# Patient Record
Sex: FEMALE | Race: WHITE | NOT HISPANIC OR LATINO | Employment: FULL TIME | URBAN - METROPOLITAN AREA
[De-identification: names, ages, dates, MRNs, and addresses within clinical notes are randomized per-mention and may not be internally consistent; named-entity substitution may affect disease eponyms.]

---

## 2017-11-17 ENCOUNTER — APPOINTMENT (OUTPATIENT)
Dept: WOMENS IMAGING | Facility: HOSPITAL | Age: 42
End: 2017-11-17

## 2017-11-17 PROCEDURE — 77067 SCR MAMMO BI INCL CAD: CPT | Performed by: RADIOLOGY

## 2017-11-17 PROCEDURE — 77063 BREAST TOMOSYNTHESIS BI: CPT | Performed by: RADIOLOGY

## 2018-07-19 ENCOUNTER — PREP FOR SURGERY (OUTPATIENT)
Dept: OTHER | Facility: HOSPITAL | Age: 43
End: 2018-07-19

## 2018-07-19 DIAGNOSIS — Z80.0 FAMILY HISTORY OF COLON CANCER: ICD-10-CM

## 2018-07-19 DIAGNOSIS — K63.5 COLON POLYPS: Primary | ICD-10-CM

## 2018-07-31 PROBLEM — Z80.0 FAMILY HISTORY OF COLON CANCER: Status: ACTIVE | Noted: 2018-07-31

## 2018-07-31 PROBLEM — K63.5 COLON POLYPS: Status: ACTIVE | Noted: 2018-07-31

## 2018-09-17 ENCOUNTER — ANESTHESIA (OUTPATIENT)
Dept: GASTROENTEROLOGY | Facility: HOSPITAL | Age: 43
End: 2018-09-17

## 2018-09-17 ENCOUNTER — HOSPITAL ENCOUNTER (OUTPATIENT)
Facility: HOSPITAL | Age: 43
Setting detail: HOSPITAL OUTPATIENT SURGERY
Discharge: HOME | End: 2018-09-17
Attending: INTERNAL MEDICINE | Admitting: INTERNAL MEDICINE

## 2018-09-17 ENCOUNTER — ANESTHESIA EVENT (OUTPATIENT)
Dept: GASTROENTEROLOGY | Facility: HOSPITAL | Age: 43
End: 2018-09-17

## 2018-09-17 VITALS
SYSTOLIC BLOOD PRESSURE: 127 MMHG | RESPIRATION RATE: 16 BRPM | HEART RATE: 60 BPM | BODY MASS INDEX: 26.4 KG/M2 | TEMPERATURE: 97.9 F | DIASTOLIC BLOOD PRESSURE: 84 MMHG | HEIGHT: 65 IN | OXYGEN SATURATION: 100 % | WEIGHT: 158.44 LBS

## 2018-09-17 DIAGNOSIS — K63.5 COLON POLYPS: ICD-10-CM

## 2018-09-17 DIAGNOSIS — Z80.0 FAMILY HISTORY OF COLON CANCER: ICD-10-CM

## 2018-09-17 LAB
B-HCG UR QL: NEGATIVE
INTERNAL NEGATIVE CONTROL: NEGATIVE
INTERNAL POSITIVE CONTROL: POSITIVE
Lab: NORMAL

## 2018-09-17 PROCEDURE — 45380 COLONOSCOPY AND BIOPSY: CPT | Performed by: INTERNAL MEDICINE

## 2018-09-17 PROCEDURE — 81025 URINE PREGNANCY TEST: CPT | Performed by: INTERNAL MEDICINE

## 2018-09-17 PROCEDURE — 25010000002 PROPOFOL 10 MG/ML EMULSION: Performed by: NURSE ANESTHETIST, CERTIFIED REGISTERED

## 2018-09-17 PROCEDURE — 88305 TISSUE EXAM BY PATHOLOGIST: CPT | Performed by: INTERNAL MEDICINE

## 2018-09-17 RX ORDER — PROPOFOL 10 MG/ML
VIAL (ML) INTRAVENOUS CONTINUOUS PRN
Status: DISCONTINUED | OUTPATIENT
Start: 2018-09-17 | End: 2018-09-17 | Stop reason: SURG

## 2018-09-17 RX ORDER — LIDOCAINE HYDROCHLORIDE 20 MG/ML
INJECTION, SOLUTION INFILTRATION; PERINEURAL AS NEEDED
Status: DISCONTINUED | OUTPATIENT
Start: 2018-09-17 | End: 2018-09-17 | Stop reason: SURG

## 2018-09-17 RX ORDER — PROPOFOL 10 MG/ML
VIAL (ML) INTRAVENOUS AS NEEDED
Status: DISCONTINUED | OUTPATIENT
Start: 2018-09-17 | End: 2018-09-17 | Stop reason: SURG

## 2018-09-17 RX ORDER — SODIUM CHLORIDE, SODIUM LACTATE, POTASSIUM CHLORIDE, CALCIUM CHLORIDE 600; 310; 30; 20 MG/100ML; MG/100ML; MG/100ML; MG/100ML
30 INJECTION, SOLUTION INTRAVENOUS CONTINUOUS PRN
Status: DISCONTINUED | OUTPATIENT
Start: 2018-09-17 | End: 2018-09-17 | Stop reason: HOSPADM

## 2018-09-17 RX ADMIN — SODIUM CHLORIDE, POTASSIUM CHLORIDE, SODIUM LACTATE AND CALCIUM CHLORIDE: 600; 310; 30; 20 INJECTION, SOLUTION INTRAVENOUS at 14:15

## 2018-09-17 RX ADMIN — LIDOCAINE HYDROCHLORIDE 100 MG: 20 INJECTION, SOLUTION INFILTRATION; PERINEURAL at 14:19

## 2018-09-17 RX ADMIN — SODIUM CHLORIDE, POTASSIUM CHLORIDE, SODIUM LACTATE AND CALCIUM CHLORIDE 30 ML/HR: 600; 310; 30; 20 INJECTION, SOLUTION INTRAVENOUS at 13:52

## 2018-09-17 RX ADMIN — PROPOFOL 50 MG: 10 INJECTION, EMULSION INTRAVENOUS at 14:19

## 2018-09-17 RX ADMIN — PROPOFOL 250 MCG/KG/MIN: 10 INJECTION, EMULSION INTRAVENOUS at 14:19

## 2018-09-17 NOTE — ANESTHESIA POSTPROCEDURE EVALUATION
Patient: Morena France    Procedure Summary     Date:  09/17/18 Room / Location:  [facility] ENDOSCOPY 8 / [facility] ENDOSCOPY    Anesthesia Start:  1415 Anesthesia Stop:  1444    Procedure:  COLONOSCOPY to CECUM/TI with COLD BX POLYPECTOMY (N/A ) Diagnosis:       Family history of colon cancer      Colon polyps      (Family history of colon cancer [Z80.0])      (Colon polyps [K63.5])    Surgeon:  Frandy Snow MD Provider:      Anesthesia Type:  MAC ASA Status:  2          Anesthesia Type: MAC  Last vitals  BP   127/84 (09/17/18 1502)   Temp   36.6 °C (97.9 °F) (09/17/18 1452)   Pulse   60 (09/17/18 1502)   Resp   16 (09/17/18 1502)     SpO2   100 % (09/17/18 1502)     Post Anesthesia Care and Evaluation    Patient location during evaluation: PACU  Patient participation: complete - patient participated  Level of consciousness: awake and alert  Pain management: adequate  Airway patency: patent  Anesthetic complications: No anesthetic complications    Cardiovascular status: acceptable  Respiratory status: acceptable  Hydration status: acceptable    Comments: --------------------            09/17/18               1502     --------------------   BP:       127/84     Pulse:      60       Resp:       16       Temp:                SpO2:      100%     --------------------

## 2018-09-17 NOTE — ANESTHESIA POSTPROCEDURE EVALUATION
Patient: Morena France    Procedure Summary     Date:  09/17/18 Room / Location:  [facility] ENDOSCOPY 8 / [facility] ENDOSCOPY    Anesthesia Start:  1415 Anesthesia Stop:  1444    Procedure:  COLONOSCOPY to CECUM/TI with COLD BX POLYPECTOMY (N/A ) Diagnosis:       Family history of colon cancer      Colon polyps      (Family history of colon cancer [Z80.0])      (Colon polyps [K63.5])    Surgeon:  Frandy Snow MD Provider:  Nicho Torres MD    Anesthesia Type:  MAC ASA Status:  2          Anesthesia Type: MAC  Last vitals  BP   127/84 (09/17/18 1502)   Temp   36.6 °C (97.9 °F) (09/17/18 1452)   Pulse   60 (09/17/18 1502)   Resp   16 (09/17/18 1502)     SpO2   100 % (09/17/18 1502)     Post Anesthesia Care and Evaluation    Patient location during evaluation: PACU  Patient participation: complete - patient participated  Level of consciousness: awake and alert  Pain management: adequate  Airway patency: patent  Anesthetic complications: No anesthetic complications    Cardiovascular status: acceptable  Respiratory status: acceptable  Hydration status: acceptable    Comments: --------------------            09/17/18               1502     --------------------   BP:       127/84     Pulse:      60       Resp:       16       Temp:                SpO2:      100%     --------------------

## 2018-09-17 NOTE — ANESTHESIA PREPROCEDURE EVALUATION
Anesthesia Evaluation     Patient summary reviewed and Nursing notes reviewed   NPO Solid Status: > 8 hours  NPO Liquid Status: > 4 hours           Airway   Mallampati: II  Neck ROM: full  No difficulty expected  Dental - normal exam     Pulmonary     breath sounds clear to auscultation  Cardiovascular     Rhythm: regular        Neuro/Psych  GI/Hepatic/Renal/Endo      Musculoskeletal     Abdominal    Substance History      OB/GYN          Other                        Anesthesia Plan    ASA 2     MAC     intravenous induction   Anesthetic plan, all risks, benefits, and alternatives have been provided, discussed and informed consent has been obtained with: patient.

## 2018-09-17 NOTE — H&P
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 Her dad had colon polyps and her paternal GM had colon cancer. She last had a colonoscopy in 7/13.    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 Her dad had colon polyps and her paternal GM had colon cancer. She last had a colonoscopy in 7/13.        Past Medical History:   Past Medical History:   Diagnosis Date   • Breast disorder     Titanium marker to left breast   • Endometriosis    • GERD (gastroesophageal reflux disease)    • History of colonoscopy with polypectomy        Family History:  Family History   Problem Relation Age of Onset   • Colon polyps Father    • Colon cancer Paternal Grandmother        Social History:   reports that she has quit smoking. She does not have any smokeless tobacco history on file. She reports that she drinks alcohol.    Medications:   No prescriptions prior to admission.       Allergies:  Patient has no known allergies.    ROS:    Pertinent items are noted in HPI     Objective     Blood pressure 127/82, pulse 64, temperature 98.4 °F (36.9 °C), temperature source Oral, resp. rate 16, height 165.1 cm (65\"), weight 71.9 kg (158 lb 7 oz), last menstrual period 09/10/2018, SpO2 96 %.    Physical Exam   Constitutional: Pt is oriented to person, place, and time and well-developed, well-nourished, and in no distress.   HENT:   Mouth/Throat: Oropharynx is clear and moist.   Neck: Normal range of motion. Neck supple.   Cardiovascular: Normal rate, regular rhythm and normal heart sounds.    Pulmonary/Chest: Effort normal and breath sounds normal. No respiratory distress. No  wheezes.   Abdominal: Soft. Bowel sounds are normal.   Skin: Skin is warm and dry.   Psychiatric: Mood, memory, affect and judgment normal.     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 Her dad had colon polyps and her " paternal GM had colon cancer. She last had a colonoscopy in 7/13.    Anticipated Surgical Procedure:  Colonoscopy    The risks, benefits, and alternatives of this procedure have been discussed with the patient or the responsible party- the patient understands and agrees to proceed.

## 2018-09-18 LAB
CYTO UR: NORMAL
LAB AP CASE REPORT: NORMAL
PATH REPORT.FINAL DX SPEC: NORMAL
PATH REPORT.GROSS SPEC: NORMAL

## 2018-09-27 ENCOUNTER — TELEPHONE (OUTPATIENT)
Dept: GASTROENTEROLOGY | Facility: CLINIC | Age: 43
End: 2018-09-27

## 2018-09-27 NOTE — TELEPHONE ENCOUNTER
----- Message from Frandy Snow MD sent at 9/23/2018  9:10 PM EDT -----  Tell her that the colon polyp that was removed was not cancerous and not precancerous.  I would recommend that she have a repeat colonoscopy in 5 years.. Thx. kjh

## 2018-09-27 NOTE — TELEPHONE ENCOUNTER
Pt called and advised per Dr Snow that the colon polyp that was removed was not cancerous and was not precancerous.  He recommends a repeat c/s in 5 yrs. Pt verb understanding.

## 2018-11-20 ENCOUNTER — APPOINTMENT (OUTPATIENT)
Dept: WOMENS IMAGING | Facility: HOSPITAL | Age: 43
End: 2018-11-20

## 2018-11-20 PROCEDURE — 77067 SCR MAMMO BI INCL CAD: CPT | Performed by: RADIOLOGY

## 2018-11-20 PROCEDURE — 77063 BREAST TOMOSYNTHESIS BI: CPT | Performed by: RADIOLOGY

## 2019-12-04 ENCOUNTER — APPOINTMENT (OUTPATIENT)
Dept: WOMENS IMAGING | Facility: HOSPITAL | Age: 44
End: 2019-12-04

## 2019-12-04 PROCEDURE — 77063 BREAST TOMOSYNTHESIS BI: CPT | Performed by: RADIOLOGY

## 2019-12-04 PROCEDURE — 77067 SCR MAMMO BI INCL CAD: CPT | Performed by: RADIOLOGY

## 2020-12-09 ENCOUNTER — APPOINTMENT (OUTPATIENT)
Dept: WOMENS IMAGING | Facility: HOSPITAL | Age: 45
End: 2020-12-09

## 2020-12-09 PROCEDURE — 77063 BREAST TOMOSYNTHESIS BI: CPT | Performed by: RADIOLOGY

## 2020-12-09 PROCEDURE — 77067 SCR MAMMO BI INCL CAD: CPT | Performed by: RADIOLOGY

## 2021-12-15 ENCOUNTER — APPOINTMENT (OUTPATIENT)
Dept: WOMENS IMAGING | Facility: HOSPITAL | Age: 46
End: 2021-12-15

## 2021-12-15 PROCEDURE — 77063 BREAST TOMOSYNTHESIS BI: CPT | Performed by: RADIOLOGY

## 2021-12-15 PROCEDURE — 77067 SCR MAMMO BI INCL CAD: CPT | Performed by: RADIOLOGY

## 2022-10-25 ENCOUNTER — OFFICE VISIT (OUTPATIENT)
Dept: GASTROENTEROLOGY | Facility: CLINIC | Age: 47
End: 2022-10-25

## 2022-10-25 ENCOUNTER — TELEPHONE (OUTPATIENT)
Dept: GASTROENTEROLOGY | Facility: CLINIC | Age: 47
End: 2022-10-25

## 2022-10-25 VITALS — DIASTOLIC BLOOD PRESSURE: 127 MMHG | SYSTOLIC BLOOD PRESSURE: 185 MMHG

## 2022-10-25 DIAGNOSIS — Z80.0 FAMILY HISTORY OF COLON CANCER: ICD-10-CM

## 2022-10-25 DIAGNOSIS — K63.5 HYPERPLASTIC COLONIC POLYP, UNSPECIFIED PART OF COLON: ICD-10-CM

## 2022-10-25 DIAGNOSIS — K58.2 IRRITABLE BOWEL SYNDROME WITH BOTH CONSTIPATION AND DIARRHEA: ICD-10-CM

## 2022-10-25 DIAGNOSIS — R10.13 EPIGASTRIC PAIN: Primary | ICD-10-CM

## 2022-10-25 DIAGNOSIS — K21.9 GASTROESOPHAGEAL REFLUX DISEASE, UNSPECIFIED WHETHER ESOPHAGITIS PRESENT: ICD-10-CM

## 2022-10-25 PROCEDURE — 99204 OFFICE O/P NEW MOD 45 MIN: CPT | Performed by: NURSE PRACTITIONER

## 2022-10-25 RX ORDER — FAMOTIDINE 20 MG/1
20 TABLET, FILM COATED ORAL DAILY
COMMUNITY
End: 2022-10-25

## 2022-10-25 RX ORDER — ATENOLOL 50 MG/1
TABLET ORAL
COMMUNITY
Start: 2022-09-09 | End: 2022-11-08

## 2022-10-25 RX ORDER — HYDROXYCHLOROQUINE SULFATE 200 MG/1
1 TABLET, FILM COATED ORAL EVERY 12 HOURS
COMMUNITY
Start: 2022-06-01

## 2022-10-25 RX ORDER — LISINOPRIL 20 MG/1
40 TABLET ORAL DAILY
COMMUNITY
Start: 2022-09-04 | End: 2023-03-03 | Stop reason: ALTCHOICE

## 2022-10-25 RX ORDER — MELATONIN
1000 DAILY
COMMUNITY

## 2022-10-25 RX ORDER — PANTOPRAZOLE SODIUM 40 MG/1
40 TABLET, DELAYED RELEASE ORAL DAILY
Qty: 30 TABLET | Refills: 0 | Status: SHIPPED | OUTPATIENT
Start: 2022-10-25 | End: 2022-11-13 | Stop reason: SDUPTHER

## 2022-10-25 NOTE — PROGRESS NOTES
Chief Complaint   Patient presents with   • Diarrhea   • Heartburn   • Constipation       Morena France is a 47 y.o. female who presents with GERD.    HPI  47-year-old female presents today as a new patient to our clinic with worsening GERD.  She has seen Dr. Snow in the past.  She is new to me today.  Last time she saw Dr. Snow was for a colonoscopy in September 2018.  At that time she was found to have some hyperplastic colon polyps.  Recall in 2023.  She has a history of diverticulitis.  She also has history of appendectomy.  She is a family history of colon cancer and colon polyps.  She has a history of GERD and admits she has been taking Pepcid and Tagamet when her symptoms flareup.  She admits she does not take anything routinely.  She tells me the reflux is really bad lately and she even has a bad taste in her mouth now.  She also has a history of IBS-mixed and admits she is been taking Culturelle IBS support probiotic.  She tells me she really does not think it is helping that much.  She is getting more more backed up lately.  She will have periods of constipation and then periods of diarrhea.  Having lots of gas and bloating.  She denies any dysphagia, nausea and vomiting, rectal bleeding or melena.  She admits her appetite is okay and her weight is stable.  Her blood pressure is high today and she tells me that she is currently working with her primary care provider about this.  Past Medical History:   Diagnosis Date   • Breast disorder     Titanium marker to left breast   • Diverticulitis of colon    • Endometriosis    • GERD (gastroesophageal reflux disease)    • History of colonoscopy with polypectomy    • Hypertension    • Irritable bowel syndrome with both constipation and diarrhea 10/25/2022       Past Surgical History:   Procedure Laterality Date   • APPENDECTOMY     • COLONOSCOPY N/A 09/17/2018    Procedure: COLONOSCOPY to CECUM/TI with COLD BX POLYPECTOMY;  Surgeon: Frandy Snow MD;   Location: [facility] ENDOSCOPY;  Service: Gastroenterology   • HYSTERECTOMY           Current Outpatient Medications:   •  atenolol (TENORMIN) 50 MG tablet, , Disp: , Rfl:   •  cholecalciferol (VITAMIN D3) 25 MCG (1000 UT) tablet, Take 1 tablet by mouth Daily., Disp: , Rfl:   •  hydroxychloroquine (PLAQUENIL) 200 MG tablet, Take 1 tablet by mouth Every 12 (Twelve) Hours., Disp: , Rfl:   •  lisinopril (PRINIVIL,ZESTRIL) 20 MG tablet, Take 2 tablets by mouth Daily., Disp: , Rfl:   •  pantoprazole (PROTONIX) 40 MG EC tablet, Take 1 tablet by mouth Daily., Disp: 30 tablet, Rfl: 0    Allergies   Allergen Reactions   • Chlorhexidine Rash       Social History     Socioeconomic History   • Marital status:    Tobacco Use   • Smoking status: Former   • Tobacco comments:     quit smoking 10years ago   Substance and Sexual Activity   • Alcohol use: Yes     Comment: rare       Family History   Problem Relation Age of Onset   • Colon polyps Father    • Colon cancer Paternal Grandmother        Review of Systems   Constitutional: Negative for appetite change, chills, diaphoresis, fatigue, fever and unexpected weight change.   HENT: Negative for nosebleeds, postnasal drip, sore throat, trouble swallowing and voice change.    Respiratory: Negative for cough, choking, chest tightness, shortness of breath, wheezing and stridor.    Cardiovascular: Negative for chest pain, palpitations and leg swelling.   Gastrointestinal: Positive for abdominal distention, abdominal pain, constipation, diarrhea and nausea. Negative for anal bleeding, blood in stool, rectal pain and vomiting.   Endocrine: Negative for polydipsia, polyphagia and polyuria.   Musculoskeletal: Negative for gait problem.   Skin: Negative for rash and wound.   Allergic/Immunologic: Negative for food allergies.   Neurological: Negative for dizziness, speech difficulty and light-headedness.   Psychiatric/Behavioral: Negative for confusion, self-injury, sleep disturbance and  suicidal ideas.       Vitals:    10/25/22 0835   BP: (!) 185/127       Physical Exam  Constitutional:       General: She is not in acute distress.     Appearance: She is well-developed. She is not ill-appearing.   HENT:      Head: Normocephalic.   Eyes:      Pupils: Pupils are equal, round, and reactive to light.   Cardiovascular:      Rate and Rhythm: Normal rate and regular rhythm.      Heart sounds: Normal heart sounds.   Pulmonary:      Effort: Pulmonary effort is normal.      Breath sounds: Normal breath sounds.   Abdominal:      General: Bowel sounds are normal. There is distension.      Palpations: Abdomen is soft. There is no mass.      Tenderness: There is no abdominal tenderness. There is no guarding or rebound.      Hernia: No hernia is present.   Musculoskeletal:         General: Normal range of motion.   Skin:     General: Skin is warm and dry.   Neurological:      Mental Status: She is alert and oriented to person, place, and time.   Psychiatric:         Speech: Speech normal.         Behavior: Behavior normal.         Judgment: Judgment normal.         No radiology results for the last 7 days       Assessment & Plan      1. Epigastric pain  - Case Request; Standing  - Case Request    2. Gastroesophageal reflux disease, unspecified whether esophagitis present  - Case Request; Standing  - Case Request    3. Irritable bowel syndrome with both constipation and diarrhea  - Case Request; Standing  - Case Request    4. Hyperplastic colonic polyp, unspecified part of colon  - Case Request; Standing  - Case Request    5. Family history of colon cancer  - Case Request; Standing  - Case Request      I had my nursing staff take her blood pressure a second time.  Still elevated at 186/101.  Patient denies any shortness of breath or chest pain.  She does feel like anxiety makes it worse.  She plans on following up with her PCP about this today.  I did tell her if he started having chest pain or shortness of breath  to take herself to the ER for immediate evaluation.  Given her history and current symptoms recommend EGD and colonoscopy with Dr. Snow for further evaluation.  Patient is agreeable to the scopes.  GERD is not well controlled currently.  We will put her on Protonix 40 mg daily to start.  Continue GERD precautions.  As for her constipation recommend daily fiber or daily MiraLAX to start.  Continue high-fiber diet.  Patient to call the office next week with an update.  Patient to follow-up with me in 2 weeks.  Patient is agreeable to the plan.

## 2022-10-25 NOTE — TELEPHONE ENCOUNTER
JASKARAN patient in office. Scheduled 01/24/2023  with arrival time of  715 am . Prep paper work given to patient. Patient advised arrival time may change based on [facility] guidelines. JASKARAN CORTEZ

## 2022-11-08 ENCOUNTER — OFFICE VISIT (OUTPATIENT)
Dept: GASTROENTEROLOGY | Facility: CLINIC | Age: 47
End: 2022-11-08

## 2022-11-08 VITALS
SYSTOLIC BLOOD PRESSURE: 206 MMHG | HEIGHT: 66 IN | WEIGHT: 166.2 LBS | TEMPERATURE: 96.2 F | OXYGEN SATURATION: 98 % | BODY MASS INDEX: 26.71 KG/M2 | HEART RATE: 59 BPM | DIASTOLIC BLOOD PRESSURE: 109 MMHG

## 2022-11-08 DIAGNOSIS — K21.9 GASTROESOPHAGEAL REFLUX DISEASE, UNSPECIFIED WHETHER ESOPHAGITIS PRESENT: Primary | ICD-10-CM

## 2022-11-08 DIAGNOSIS — R11.0 NAUSEA: ICD-10-CM

## 2022-11-08 DIAGNOSIS — K58.2 IRRITABLE BOWEL SYNDROME WITH BOTH CONSTIPATION AND DIARRHEA: ICD-10-CM

## 2022-11-08 DIAGNOSIS — K63.5 HYPERPLASTIC COLONIC POLYP, UNSPECIFIED PART OF COLON: ICD-10-CM

## 2022-11-08 PROCEDURE — 99214 OFFICE O/P EST MOD 30 MIN: CPT | Performed by: NURSE PRACTITIONER

## 2022-11-08 RX ORDER — METOPROLOL TARTRATE 50 MG/1
TABLET, FILM COATED ORAL
COMMUNITY
Start: 2022-10-25

## 2022-11-08 RX ORDER — ONDANSETRON 4 MG/1
4 TABLET, FILM COATED ORAL EVERY 8 HOURS PRN
Qty: 15 TABLET | Refills: 1 | Status: SHIPPED | OUTPATIENT
Start: 2022-11-08 | End: 2022-12-29 | Stop reason: SDUPTHER

## 2022-11-08 NOTE — PROGRESS NOTES
Chief Complaint   Patient presents with   • Abdominal Pain   • Nausea       Morena France is a  47 y.o. female here for a follow up visit for abdominal pain.    HPI  47-year-old female presents today for follow-up visit for abdominal pain and nausea.  She is a patient of Dr. Snow.  She was last seen in the office by me on 10/25/2022.  She has a history of GERD and admits she does really well during the day on Protonix 40 mg daily.  However she is having a lot of breakthrough reflux symptoms at night.  She been having more epigastric pain and more nausea.  She does use Zofran as needed.  She does have a history of constipation and admits she does well on MiraLAX as needed.  Her last colonoscopy was in September 2018.  She does have a history of hyperplastic colon polyps.  She is a family history of colon cancer.  She is scheduled for EGD and colonoscopy in January 2023 with Dr. Snow.  She denies any dysphagia, diarrhea, rectal bleeding or melena.  She admits appetite is good and her weight is stable.  She does admit her blood pressures been high lately because she has been working with her primary care about this.  They have been adjusting her medications.  She does feel like her anxiety makes things worse.  She definitely gets anxious coming to the doctor.  Past Medical History:   Diagnosis Date   • Breast disorder     Titanium marker to left breast   • Diverticulitis of colon    • Endometriosis    • GERD (gastroesophageal reflux disease)    • History of colonoscopy with polypectomy    • Hypertension    • Irritable bowel syndrome with both constipation and diarrhea 10/25/2022       Past Surgical History:   Procedure Laterality Date   • APPENDECTOMY     • COLONOSCOPY N/A 09/17/2018    Procedure: COLONOSCOPY to CECUM/TI with COLD BX POLYPECTOMY;  Surgeon: Frandy Snow MD;  Location: [facility] ENDOSCOPY;  Service: Gastroenterology   • HYSTERECTOMY         Scheduled Meds:    Continuous Infusions:No current  facility-administered medications for this visit.      PRN Meds:.    Allergies   Allergen Reactions   • Chlorhexidine Rash       Social History     Socioeconomic History   • Marital status:    Tobacco Use   • Smoking status: Former   • Tobacco comments:     quit smoking 10years ago   Substance and Sexual Activity   • Alcohol use: Yes     Comment: rare       Family History   Problem Relation Age of Onset   • Colon polyps Father    • Colon cancer Paternal Grandmother        Review of Systems   Constitutional: Negative for appetite change, chills, diaphoresis, fatigue, fever and unexpected weight change.   HENT: Negative for nosebleeds, postnasal drip, sore throat, trouble swallowing and voice change.    Respiratory: Negative for cough, choking, chest tightness, shortness of breath, wheezing and stridor.    Cardiovascular: Negative for chest pain, palpitations and leg swelling.   Gastrointestinal: Positive for abdominal distention, abdominal pain and nausea. Negative for anal bleeding, blood in stool, constipation, diarrhea, rectal pain and vomiting.   Endocrine: Negative for polydipsia, polyphagia and polyuria.   Musculoskeletal: Negative for gait problem.   Skin: Negative for rash and wound.   Allergic/Immunologic: Negative for food allergies.   Neurological: Negative for dizziness, speech difficulty and light-headedness.   Psychiatric/Behavioral: Negative for confusion, self-injury, sleep disturbance and suicidal ideas.       Vitals:    11/08/22 0847   BP: (!) 206/109   Pulse: 59   Temp: 96.2 °F (35.7 °C)   SpO2: 98%       Physical Exam  Constitutional:       General: She is not in acute distress.     Appearance: She is well-developed. She is not ill-appearing.   HENT:      Head: Normocephalic.   Eyes:      Pupils: Pupils are equal, round, and reactive to light.   Cardiovascular:      Rate and Rhythm: Normal rate and regular rhythm.      Heart sounds: Normal heart sounds.   Pulmonary:      Effort: Pulmonary  effort is normal.      Breath sounds: Normal breath sounds.   Abdominal:      General: Bowel sounds are normal. There is distension.      Palpations: Abdomen is soft. There is no mass.      Tenderness: There is no abdominal tenderness. There is no guarding or rebound.      Hernia: No hernia is present.   Musculoskeletal:         General: Normal range of motion.   Skin:     General: Skin is warm and dry.   Neurological:      Mental Status: She is alert and oriented to person, place, and time.   Psychiatric:         Speech: Speech normal.         Behavior: Behavior normal.         Judgment: Judgment normal.         No radiology results for the last 7 days     Diagnoses and all orders for this visit:    1. Gastroesophageal reflux disease, unspecified whether esophagitis present (Primary)  Overview:  Added automatically from request for surgery [id number]      2. Irritable bowel syndrome with both constipation and diarrhea  Overview:  Added automatically from request for surgery [id number]      3. Nausea    4. Hyperplastic colonic polyp, unspecified part of colon  Overview:  Added automatically from request for surgery [id number]      Other orders  -     ondansetron (Zofran) 4 MG tablet; Take 1 tablet by mouth Every 8 (Eight) Hours As Needed for Nausea or Vomiting.  Dispense: 15 tablet; Refill: 1    Her blood pressure is high again this visit.  She is working with her PCP and titrating her blood pressure medication.  She does feel like that her anxiety really makes this much worse.  She denies any shortness of breath or chest pain at this time.  GERD definitely seems better during the day on pantoprazole 40 mg every morning.  However still having some breakthrough reflux and nausea at night and first thing in the morning upon waking up.  Will increase Protonix to 40 mg twice daily and see if we can get rid of those symptoms.  I explained to the patient that the plan would be for her to take the PPI twice daily for the next 8 to  12 weeks and then hopefully be able to taper back off to just once a day.  Continue Zofran as needed for nausea.  Bowels seem to be moving well every other day on MiraLAX as needed.  Patient to call the office next week with an update.  Patient to keep scheduled EGD and colonoscopy in January with Dr. Snow.  Patient to follow-up with me in February 2023.  Patient is agreeable to the plan.

## 2022-11-14 RX ORDER — PANTOPRAZOLE SODIUM 40 MG/1
40 TABLET, DELAYED RELEASE ORAL DAILY
Qty: 30 TABLET | Refills: 0 | Status: SHIPPED | OUTPATIENT
Start: 2022-11-14 | End: 2022-12-05

## 2022-12-05 RX ORDER — PANTOPRAZOLE SODIUM 40 MG/1
TABLET, DELAYED RELEASE ORAL
Qty: 30 TABLET | Refills: 0 | Status: SHIPPED | OUTPATIENT
Start: 2022-12-05 | End: 2022-12-29 | Stop reason: SDUPTHER

## 2022-12-29 RX ORDER — PANTOPRAZOLE SODIUM 40 MG/1
40 TABLET, DELAYED RELEASE ORAL
Qty: 60 TABLET | Refills: 0 | Status: SHIPPED | OUTPATIENT
Start: 2022-12-29 | End: 2023-02-06 | Stop reason: SDUPTHER

## 2022-12-29 RX ORDER — ONDANSETRON 4 MG/1
4 TABLET, FILM COATED ORAL EVERY 8 HOURS PRN
Qty: 15 TABLET | Refills: 1 | Status: SHIPPED | OUTPATIENT
Start: 2022-12-29

## 2022-12-29 NOTE — TELEPHONE ENCOUNTER
Ghislaine request received for pantoprazole 40 mg daily and zofran 4 mg prn.     See o/v notes of 11/8/22 - \"  Will increase Protonix to 40 mg twice daily and see if we can get rid of those symptoms.  I explained to the patient that the plan would be for her to take the PPI twice daily for the next 8 to 12 weeks and then hopefully be able to taper back off to just once a day. \"    It is noted pt scheduled for egd and c/s 1/24.     Call to pt.  States has been taking pantoprazole 2x/day with complete relief of symptoms.  Message to SOCO Yang to check if may remain on 2x/day until upcoming scopes.

## 2023-01-23 ENCOUNTER — TELEPHONE (OUTPATIENT)
Dept: GASTROENTEROLOGY | Facility: CLINIC | Age: 48
End: 2023-01-23

## 2023-01-23 NOTE — TELEPHONE ENCOUNTER
Caller: Morena France    Relationship to patient: Self    Best call back number: [phone] CONTACT ANYTIME     Patient is needing:PT WANTED TO TAKE THE PILL PREP INSTEAD OF THE LIQUID PREP.PT WANTED TO KNOW IF THIS WAS OK.  PT IS HAVING THEIR PROCEDURE TOMORROW. PLEASE GIVE A CALL BACK.

## 2023-01-24 ENCOUNTER — HOSPITAL ENCOUNTER (OUTPATIENT)
Facility: HOSPITAL | Age: 48
Setting detail: HOSPITAL OUTPATIENT SURGERY
Discharge: HOME | End: 2023-01-24
Attending: INTERNAL MEDICINE | Admitting: INTERNAL MEDICINE
Payer: COMMERCIAL

## 2023-01-24 ENCOUNTER — ANESTHESIA (OUTPATIENT)
Dept: GASTROENTEROLOGY | Facility: HOSPITAL | Age: 48
End: 2023-01-24
Payer: COMMERCIAL

## 2023-01-24 ENCOUNTER — ANESTHESIA EVENT (OUTPATIENT)
Dept: GASTROENTEROLOGY | Facility: HOSPITAL | Age: 48
End: 2023-01-24
Payer: COMMERCIAL

## 2023-01-24 VITALS
WEIGHT: 166 LBS | RESPIRATION RATE: 16 BRPM | HEART RATE: 50 BPM | DIASTOLIC BLOOD PRESSURE: 98 MMHG | SYSTOLIC BLOOD PRESSURE: 172 MMHG | OXYGEN SATURATION: 99 % | HEIGHT: 66 IN | BODY MASS INDEX: 26.68 KG/M2

## 2023-01-24 DIAGNOSIS — K58.2 IRRITABLE BOWEL SYNDROME WITH BOTH CONSTIPATION AND DIARRHEA: ICD-10-CM

## 2023-01-24 DIAGNOSIS — K21.9 GASTROESOPHAGEAL REFLUX DISEASE, UNSPECIFIED WHETHER ESOPHAGITIS PRESENT: ICD-10-CM

## 2023-01-24 DIAGNOSIS — Z80.0 FAMILY HISTORY OF COLON CANCER: ICD-10-CM

## 2023-01-24 DIAGNOSIS — R10.13 EPIGASTRIC PAIN: ICD-10-CM

## 2023-01-24 DIAGNOSIS — K63.5 HYPERPLASTIC COLONIC POLYP, UNSPECIFIED PART OF COLON: ICD-10-CM

## 2023-01-24 LAB
ALBUMIN SERPL-MCNC: 4.2 G/DL (ref 3.5–5.2)
ALBUMIN/GLOB SERPL: 2.5 G/DL
ALP SERPL-CCNC: 59 U/L (ref 39–117)
ALT SERPL W P-5'-P-CCNC: 8 U/L (ref 1–33)
ANION GAP SERPL CALCULATED.3IONS-SCNC: 8 MMOL/L (ref 5–15)
AST SERPL-CCNC: 15 U/L (ref 1–32)
BASOPHILS # BLD AUTO: 0.02 10*3/MM3 (ref 0–0.2)
BASOPHILS NFR BLD AUTO: 0.5 % (ref 0–1.5)
BILIRUB SERPL-MCNC: 0.5 MG/DL (ref 0–1.2)
BUN SERPL-MCNC: 9 MG/DL (ref 6–20)
BUN/CREAT SERPL: 9.8 (ref 7–25)
CALCIUM SPEC-SCNC: 9.3 MG/DL (ref 8.6–10.5)
CHLORIDE SERPL-SCNC: 105 MMOL/L (ref 98–107)
CO2 SERPL-SCNC: 27 MMOL/L (ref 22–29)
CREAT SERPL-MCNC: 0.92 MG/DL (ref 0.57–1)
DEPRECATED RDW RBC AUTO: 41.9 FL (ref 37–54)
EGFRCR SERPLBLD CKD-EPI 2021: 76.5 ML/MIN/1.73
EOSINOPHIL # BLD AUTO: 0.04 10*3/MM3 (ref 0–0.4)
EOSINOPHIL NFR BLD AUTO: 1.1 % (ref 0.3–6.2)
ERYTHROCYTE [DISTWIDTH] IN BLOOD BY AUTOMATED COUNT: 13.6 % (ref 12.3–15.4)
GLOBULIN UR ELPH-MCNC: 1.7 GM/DL
GLUCOSE SERPL-MCNC: 93 MG/DL (ref 65–99)
HCT VFR BLD AUTO: 36.3 % (ref 34–46.6)
HGB BLD-MCNC: 12.3 G/DL (ref 12–15.9)
IMM GRANULOCYTES # BLD AUTO: 0.01 10*3/MM3 (ref 0–0.05)
IMM GRANULOCYTES NFR BLD AUTO: 0.3 % (ref 0–0.5)
LYMPHOCYTES # BLD AUTO: 1.33 10*3/MM3 (ref 0.7–3.1)
LYMPHOCYTES NFR BLD AUTO: 35.7 % (ref 19.6–45.3)
MAGNESIUM SERPL-MCNC: 1.8 MG/DL (ref 1.6–2.6)
MCH RBC QN AUTO: 29 PG (ref 26.6–33)
MCHC RBC AUTO-ENTMCNC: 33.9 G/DL (ref 31.5–35.7)
MCV RBC AUTO: 85.6 FL (ref 79–97)
MONOCYTES # BLD AUTO: 0.27 10*3/MM3 (ref 0.1–0.9)
MONOCYTES NFR BLD AUTO: 7.2 % (ref 5–12)
NEUTROPHILS NFR BLD AUTO: 2.06 10*3/MM3 (ref 1.7–7)
NEUTROPHILS NFR BLD AUTO: 55.2 % (ref 42.7–76)
NRBC BLD AUTO-RTO: 0 /100 WBC (ref 0–0.2)
PLATELET # BLD AUTO: 163 10*3/MM3 (ref 140–450)
PMV BLD AUTO: 11.1 FL (ref 6–12)
POTASSIUM SERPL-SCNC: 3.8 MMOL/L (ref 3.5–5.2)
PROT SERPL-MCNC: 5.9 G/DL (ref 6–8.5)
RBC # BLD AUTO: 4.24 10*6/MM3 (ref 3.77–5.28)
SODIUM SERPL-SCNC: 140 MMOL/L (ref 136–145)
WBC NRBC COR # BLD: 3.73 10*3/MM3 (ref 3.4–10.8)

## 2023-01-24 PROCEDURE — 88305 TISSUE EXAM BY PATHOLOGIST: CPT | Performed by: INTERNAL MEDICINE

## 2023-01-24 PROCEDURE — 43239 EGD BIOPSY SINGLE/MULTIPLE: CPT | Performed by: INTERNAL MEDICINE

## 2023-01-24 PROCEDURE — 85025 COMPLETE CBC W/AUTO DIFF WBC: CPT | Performed by: INTERNAL MEDICINE

## 2023-01-24 PROCEDURE — 25010000002 PROPOFOL 10 MG/ML EMULSION: Performed by: ANESTHESIOLOGY

## 2023-01-24 PROCEDURE — 45380 COLONOSCOPY AND BIOPSY: CPT | Performed by: INTERNAL MEDICINE

## 2023-01-24 PROCEDURE — 83735 ASSAY OF MAGNESIUM: CPT | Performed by: INTERNAL MEDICINE

## 2023-01-24 PROCEDURE — 80053 COMPREHEN METABOLIC PANEL: CPT | Performed by: INTERNAL MEDICINE

## 2023-01-24 RX ORDER — EPHEDRINE SULFATE 50 MG/ML
INJECTION, SOLUTION INTRAVENOUS AS NEEDED
Status: DISCONTINUED | OUTPATIENT
Start: 2023-01-24 | End: 2023-01-24 | Stop reason: SURG

## 2023-01-24 RX ORDER — LIDOCAINE HYDROCHLORIDE 20 MG/ML
INJECTION, SOLUTION INFILTRATION; PERINEURAL AS NEEDED
Status: DISCONTINUED | OUTPATIENT
Start: 2023-01-24 | End: 2023-01-24 | Stop reason: SURG

## 2023-01-24 RX ORDER — SODIUM CHLORIDE, SODIUM LACTATE, POTASSIUM CHLORIDE, CALCIUM CHLORIDE 600; 310; 30; 20 MG/100ML; MG/100ML; MG/100ML; MG/100ML
1000 INJECTION, SOLUTION INTRAVENOUS CONTINUOUS
Status: DISCONTINUED | OUTPATIENT
Start: 2023-01-24 | End: 2023-01-24 | Stop reason: HOSPADM

## 2023-01-24 RX ORDER — PROPOFOL 10 MG/ML
VIAL (ML) INTRAVENOUS AS NEEDED
Status: DISCONTINUED | OUTPATIENT
Start: 2023-01-24 | End: 2023-01-24 | Stop reason: SURG

## 2023-01-24 RX ADMIN — SODIUM CHLORIDE, POTASSIUM CHLORIDE, SODIUM LACTATE AND CALCIUM CHLORIDE 1000 ML: 600; 310; 30; 20 INJECTION, SOLUTION INTRAVENOUS at 07:36

## 2023-01-24 RX ADMIN — LIDOCAINE HYDROCHLORIDE 60 MG: 20 INJECTION, SOLUTION INFILTRATION; PERINEURAL at 08:27

## 2023-01-24 RX ADMIN — EPHEDRINE SULFATE 10 MG: 50 INJECTION INTRAVENOUS at 08:42

## 2023-01-24 RX ADMIN — PROPOFOL 150 MG: 10 INJECTION, EMULSION INTRAVENOUS at 08:27

## 2023-01-24 RX ADMIN — PROPOFOL 200 MCG/KG/MIN: 10 INJECTION, EMULSION INTRAVENOUS at 08:27

## 2023-01-24 NOTE — ANESTHESIA POSTPROCEDURE EVALUATION
Patient: Morena France    Procedure Summary     Date: 01/24/23 Room / Location: [facility] ENDOSCOPY 5 / [facility] ENDOSCOPY    Anesthesia Start: 0820 Anesthesia Stop: 0857    Procedures:       ESOPHAGOGASTRODUODENOSCOPY WITH BIOPSIES (Esophagus)      COLONOSCOPY TO CECUM AND TERMINAL ILEUM WITH BIOPSIES Diagnosis:       Epigastric pain      Gastroesophageal reflux disease, unspecified whether esophagitis present      Irritable bowel syndrome with both constipation and diarrhea      Hyperplastic colonic polyp, unspecified part of colon      Family history of colon cancer      (Epigastric pain [R10.13])      (Gastroesophageal reflux disease, unspecified whether esophagitis present [K21.9])      (Irritable bowel syndrome with both constipation and diarrhea [K58.2])      (Hyperplastic colonic polyp, unspecified part of colon [K63.5])      (Family history of colon cancer [Z80.0])    Surgeons: Frandy Snow MD Provider: Armand Zheng MD    Anesthesia Type: MAC ASA Status: 2          Anesthesia Type: MAC    Vitals  No vitals data found for the desired time range.          Post Anesthesia Care and Evaluation    Patient location during evaluation: PHASE II  Patient participation: complete - patient participated  Level of consciousness: awake and alert  Pain management: adequate    Airway patency: patent  Anesthetic complications: No anesthetic complications  PONV Status: none  Cardiovascular status: acceptable and hemodynamically stable  Respiratory status: acceptable, nonlabored ventilation and spontaneous ventilation  Hydration status: acceptable

## 2023-01-24 NOTE — ANESTHESIA PREPROCEDURE EVALUATION
Anesthesia Evaluation     Patient summary reviewed and Nursing notes reviewed   NPO Solid Status: > 8 hours  NPO Liquid Status: > 6 hours           Airway   Mallampati: II  TM distance: >3 FB  Neck ROM: full  No difficulty expected  Dental - normal exam     Pulmonary - normal exam   (+) a smoker Former,   Cardiovascular     Rate: abnormal    (+) hypertension 2 medications or greater,     PE comment: SB/[id number]    Neuro/Psych  GI/Hepatic/Renal/Endo    (+)  GERD,      Musculoskeletal     Abdominal  - normal exam   Substance History      OB/GYN          Other                      Anesthesia Plan    ASA 2     MAC     intravenous induction     Anesthetic plan, risks, benefits, and alternatives have been provided, discussed and informed consent has been obtained with: patient.        CODE STATUS:

## 2023-01-24 NOTE — DISCHARGE INSTRUCTIONS
For the next 24 hours patient needs to be with a responsible adult.    For 24 hours DO NOT drive, operate machinery, appliances, drink alcohol, make important decisions or sign legal documents.    Start with a light or bland diet if you are feeling sick to your stomach otherwise advance to regular diet as tolerated.    Follow recommendations on procedure report if provided by your doctor.    Call Dr Snow for problems [phone]. Await pathology result in 7-10 days.    Problems may include but not limited to: large amounts of bleeding, trouble breathing, repeated vomiting, severe unrelieved pain, fever or chills.

## 2023-01-24 NOTE — H&P
Chief Complaint   Patient presents with   • Diarrhea   • Heartburn   • Constipation         Morena France is a 47 y.o. female who presents with GERD.     HPI  47-year-old female presents today as a new patient to our clinic with worsening GERD.  She has seen Dr. Snow in the past.  She is new to me today.  Last time she saw Dr. Snow was for a colonoscopy in September 2018.  At that time she was found to have some hyperplastic colon polyps.  Recall in 2023.  She has a history of diverticulitis.  She also has history of appendectomy.  She is a family history of colon cancer and colon polyps.  She has a history of GERD and admits she has been taking Pepcid and Tagamet when her symptoms flareup.  She admits she does not take anything routinely.  She tells me the reflux is really bad lately and she even has a bad taste in her mouth now.  She also has a history of IBS-mixed and admits she is been taking Culturelle IBS support probiotic.  She tells me she really does not think it is helping that much.  She is getting more more backed up lately.  She will have periods of constipation and then periods of diarrhea.  Having lots of gas and bloating.  She denies any dysphagia, nausea and vomiting, rectal bleeding or melena.  She admits her appetite is okay and her weight is stable.  Her blood pressure is high today and she tells me that she is currently working with her primary care provider about this.  Medical History        Past Medical History:   Diagnosis Date   • Breast disorder       Titanium marker to left breast   • Diverticulitis of colon     • Endometriosis     • GERD (gastroesophageal reflux disease)     • History of colonoscopy with polypectomy     • Hypertension     • Irritable bowel syndrome with both constipation and diarrhea 10/25/2022            Surgical History         Past Surgical History:   Procedure Laterality Date   • APPENDECTOMY       • COLONOSCOPY N/A 09/17/2018     Procedure: COLONOSCOPY to  CECUM/TI with COLD BX POLYPECTOMY;  Surgeon: Frandy Snow MD;  Location: [facility] ENDOSCOPY;  Service: Gastroenterology   • HYSTERECTOMY                   Current Outpatient Medications:   •  atenolol (TENORMIN) 50 MG tablet, , Disp: , Rfl:   •  cholecalciferol (VITAMIN D3) 25 MCG (1000 UT) tablet, Take 1 tablet by mouth Daily., Disp: , Rfl:   •  hydroxychloroquine (PLAQUENIL) 200 MG tablet, Take 1 tablet by mouth Every 12 (Twelve) Hours., Disp: , Rfl:   •  lisinopril (PRINIVIL,ZESTRIL) 20 MG tablet, Take 2 tablets by mouth Daily., Disp: , Rfl:   •  pantoprazole (PROTONIX) 40 MG EC tablet, Take 1 tablet by mouth Daily., Disp: 30 tablet, Rfl: 0          Allergies   Allergen Reactions   • Chlorhexidine Rash         Social History   Social History      Socioeconomic History   • Marital status:    Tobacco Use   • Smoking status: Former   • Tobacco comments:       quit smoking 10years ago   Substance and Sexual Activity   • Alcohol use: Yes       Comment: rare                  Family History   Problem Relation Age of Onset   • Colon polyps Father     • Colon cancer Paternal Grandmother           Review of Systems   Constitutional: Negative for appetite change, chills, diaphoresis, fatigue, fever and unexpected weight change.   HENT: Negative for nosebleeds, postnasal drip, sore throat, trouble swallowing and voice change.    Respiratory: Negative for cough, choking, chest tightness, shortness of breath, wheezing and stridor.    Cardiovascular: Negative for chest pain, palpitations and leg swelling.   Gastrointestinal: Positive for abdominal distention, abdominal pain, constipation, diarrhea and nausea. Negative for anal bleeding, blood in stool, rectal pain and vomiting.   Endocrine: Negative for polydipsia, polyphagia and polyuria.   Musculoskeletal: Negative for gait problem.   Skin: Negative for rash and wound.   Allergic/Immunologic: Negative for food allergies.   Neurological: Negative for dizziness, speech  difficulty and light-headedness.   Psychiatric/Behavioral: Negative for confusion, self-injury, sleep disturbance and suicidal ideas.             Vitals:     10/25/22 0835   BP: (!) 185/127         Physical Exam  Constitutional:       General: She is not in acute distress.     Appearance: She is well-developed. She is not ill-appearing.   HENT:      Head: Normocephalic.   Eyes:      Pupils: Pupils are equal, round, and reactive to light.   Cardiovascular:      Rate and Rhythm: Normal rate and regular rhythm.      Heart sounds: Normal heart sounds.   Pulmonary:      Effort: Pulmonary effort is normal.      Breath sounds: Normal breath sounds.   Abdominal:      General: Bowel sounds are normal. There is distension.      Palpations: Abdomen is soft. There is no mass.      Tenderness: There is no abdominal tenderness. There is no guarding or rebound.      Hernia: No hernia is present.   Musculoskeletal:         General: Normal range of motion.   Skin:     General: Skin is warm and dry.   Neurological:      Mental Status: She is alert and oriented to person, place, and time.   Psychiatric:         Speech: Speech normal.         Behavior: Behavior normal.         Judgment: Judgment normal.            No radiology results for the last 7 days     Assessment & Plan        1. Epigastric pain  - Case Request; Standing  - Case Request     2. Gastroesophageal reflux disease, unspecified whether esophagitis present  - Case Request; Standing  - Case Request     3. Irritable bowel syndrome with both constipation and diarrhea  - Case Request; Standing  - Case Request     4. Hyperplastic colonic polyp, unspecified part of colon  - Case Request; Standing  - Case Request     5. Family history of colon cancer  - Case Request; Standing  - Case Request        I had my nursing staff take her blood pressure a second time.  Still elevated at 186/101.  Patient denies any shortness of breath or chest pain.  She does feel like anxiety makes it  worse.  She plans on following up with her PCP about this today.  I did tell her if he started having chest pain or shortness of breath to take herself to the ER for immediate evaluation.  Given her history and current symptoms recommend EGD and colonoscopy with Dr. Snow for further evaluation.  Patient is agreeable to the scopes.  GERD is not well controlled currently.  We will put her on Protonix 40 mg daily to start.  Continue GERD precautions.  As for her constipation recommend daily fiber or daily MiraLAX to start.  Continue high-fiber diet.  Patient to call the office next week with an update.  Patient to follow-up with me in 2 weeks.  Patient is agreeable to the plan.              1/24/23 - No change from the above H and P.    Frandy Snow M.D.

## 2023-01-24 NOTE — PROGRESS NOTES
01/24/23       Tell her that her lab work from earlier today came back normal.  Please send copy of this report to her PCP.  Karena flower

## 2023-01-25 LAB
LAB AP CASE REPORT: NORMAL
PATH REPORT.FINAL DX SPEC: NORMAL
PATH REPORT.GROSS SPEC: NORMAL

## 2023-01-26 ENCOUNTER — TELEPHONE (OUTPATIENT)
Dept: GASTROENTEROLOGY | Facility: CLINIC | Age: 48
End: 2023-01-26
Payer: COMMERCIAL

## 2023-01-26 NOTE — TELEPHONE ENCOUNTER
01/24/23       Tell her that her lab work from earlier today came back normal.  Please send copy of this report to her PCP.  Karena flower       Patient Communication     Released  Seen

## 2023-02-06 NOTE — TELEPHONE ENCOUNTER
Caller: Morena France    Relationship: Self    Best call back number: [phone]    Requested Prescriptions:   Requested Prescriptions     Pending Prescriptions Disp Refills   • pantoprazole (PROTONIX) 40 MG EC tablet 60 tablet 0     Sig: Take 1 tablet by mouth 2 (Two) Times a Day Before Meals.        Pharmacy where request should be sent:  [facility]  -  [phone]    Additional details provided by patient: PT COMPLETELY OUT OF MEDICATION.    Does the patient have less than a 3 day supply:  [x] Yes  [] No    If the office needs to give you a call back, can they leave a voicemail: [x] Yes [] No    Iliana Barajas Rep   02/06/23 14:54 EST

## 2023-02-07 RX ORDER — PANTOPRAZOLE SODIUM 40 MG/1
40 TABLET, DELAYED RELEASE ORAL
Qty: 60 TABLET | Refills: 0 | Status: SHIPPED | OUTPATIENT
Start: 2023-02-07 | End: 2023-02-22 | Stop reason: SDUPTHER

## 2023-02-08 NOTE — PROGRESS NOTES
02/08/23       Tell her that the lab work done on 1/24/2023 looked okay.       The pathology from the EGD showed normal duodenal biopsies.  The stomach biopsies showed mild chronic inflammation with no evidence of Helicobacter pylori.  The colon biopsies showed minimal nonspecific inflammation on the cecal biopsies but the rectal biopsies were completely normal.  The minimal nonspecific chronic inflammation could be from resolving infection, from medications that she may take?,  Or from developing microscopic colitis.  I would recommend that she have a repeat colonoscopy in 5 years.  I would also recommend that she take a fiber supplement daily to see if the alternating diarrhea and constipation resolved.  I would have her follow-up with me in the office in 2 to 3 months.       Please send a copy of this report to her PCP.  Karena flower

## 2023-02-22 RX ORDER — PANTOPRAZOLE SODIUM 40 MG/1
40 TABLET, DELAYED RELEASE ORAL
Qty: 180 TABLET | Refills: 3 | Status: SHIPPED | OUTPATIENT
Start: 2023-02-22

## 2023-03-03 ENCOUNTER — OFFICE VISIT (OUTPATIENT)
Dept: GASTROENTEROLOGY | Facility: CLINIC | Age: 48
End: 2023-03-03
Payer: COMMERCIAL

## 2023-03-03 VITALS
HEART RATE: 57 BPM | HEIGHT: 66 IN | DIASTOLIC BLOOD PRESSURE: 99 MMHG | TEMPERATURE: 97.3 F | SYSTOLIC BLOOD PRESSURE: 175 MMHG | WEIGHT: 172 LBS | OXYGEN SATURATION: 98 % | BODY MASS INDEX: 27.64 KG/M2

## 2023-03-03 DIAGNOSIS — R19.7 DIARRHEA, UNSPECIFIED TYPE: ICD-10-CM

## 2023-03-03 DIAGNOSIS — K63.5 HYPERPLASTIC COLONIC POLYP, UNSPECIFIED PART OF COLON: ICD-10-CM

## 2023-03-03 DIAGNOSIS — K21.9 GASTROESOPHAGEAL REFLUX DISEASE WITHOUT ESOPHAGITIS: Primary | ICD-10-CM

## 2023-03-03 DIAGNOSIS — Z80.0 FAMILY HISTORY OF COLON CANCER: ICD-10-CM

## 2023-03-03 PROCEDURE — 99213 OFFICE O/P EST LOW 20 MIN: CPT | Performed by: NURSE PRACTITIONER

## 2023-03-03 RX ORDER — NAPROXEN 500 MG/1
TABLET ORAL
COMMUNITY
Start: 2023-01-13

## 2023-03-03 RX ORDER — ATORVASTATIN CALCIUM 10 MG/1
1 TABLET, FILM COATED ORAL EVERY 24 HOURS
COMMUNITY

## 2023-03-03 RX ORDER — ATENOLOL 50 MG/1
50 TABLET ORAL EVERY MORNING
COMMUNITY
Start: 2023-02-18

## 2023-03-03 RX ORDER — LISINOPRIL 40 MG/1
TABLET ORAL
COMMUNITY
Start: 2023-01-13

## 2023-03-03 RX ORDER — SERTRALINE HYDROCHLORIDE 25 MG/1
TABLET, FILM COATED ORAL
COMMUNITY
Start: 2023-02-07

## 2023-03-03 NOTE — PROGRESS NOTES
Chief Complaint   Patient presents with   • Heartburn   • EGD & Colonoscopy f/up       Morena France is a  48 y.o. female here for a follow up visit for GERD.    HPI  48-year-old female presents today for follow-up visit for GERD.  She is a patient of Dr. Snow.  She was last seen in the office by me on 11/8/2020.  She underwent EGD and colonoscopy on 1/24/2023.  EGD showed mild chronic gastritis.  Path was negative.  Colonoscopy showed nonspecific inflammation of the colon biopsies.  Rectal biopsies were normal.  Colonoscopy otherwise normal.  Path was positive for questionable microscopic colitis.  Patient is happy to report that her diarrhea has completely stopped.  Her bowels are moving really well on MiraLAX as needed.  She does try to eat a high-fiber diet and eats fiber bars.  Have a history of GERD/gastritis and admits she does well on Protonix 40 mg twice a day.  She tells me she ran out of it for about 3 weeks and really had some breakthrough reflux symptoms.  She is back on it now and taking it twice a day and it has really helped.  She is no longer having any nausea and is not needing the Zofran.  She denies any dysphagia, abdominal pain, nausea and vomiting, diarrhea, constipation, rectal bleeding or melena.  She was appetite is good and her weight is stable.  He does have a GI family history of colon cancer with a paternal grandmother and her father had colon polyps.  She has been treated in the past for diverticulitis.  Past Medical History:   Diagnosis Date   • Breast disorder     Titanium marker to left breast   • Diverticulitis of colon    • Endometriosis    • GERD (gastroesophageal reflux disease)    • History of colonoscopy with polypectomy    • Hypertension    • Irritable bowel syndrome with both constipation and diarrhea 10/25/2022       Past Surgical History:   Procedure Laterality Date   • APPENDECTOMY     • BILATERAL BREAST REDUCTION     • COLONOSCOPY N/A 09/17/2018    Procedure:  COLONOSCOPY to CECUM/TI with COLD BX POLYPECTOMY;  Surgeon: Frandy Snow MD;  Location: [facility] ENDOSCOPY;  Service: Gastroenterology   • COLONOSCOPY N/A 1/24/2023    Procedure: COLONOSCOPY TO CECUM AND TERMINAL ILEUM WITH BIOPSIES;  Surgeon: Frandy Snow MD;  Location: [facility] ENDOSCOPY;  Service: Gastroenterology;  Laterality: N/A;  PRE- FAMILY HISTORY OF COLON CANCER, DIARRHEA  POST- INTERNAL HEMORRHOIDS, DIVERTICULOSIS   • ENDOSCOPY N/A 1/24/2023    Procedure: ESOPHAGOGASTRODUODENOSCOPY WITH BIOPSIES;  Surgeon: Frandy Snow MD;  Location: [facility] ENDOSCOPY;  Service: Gastroenterology;  Laterality: N/A;  PRE- DYSPEPSIA  POST- GASTRITS   • HYSTERECTOMY         Scheduled Meds:    Continuous Infusions:No current facility-administered medications for this visit.      PRN Meds:.    Allergies   Allergen Reactions   • Chlorhexidine Rash       Social History     Socioeconomic History   • Marital status:    Tobacco Use   • Smoking status: Former   • Tobacco comments:     quit smoking 10years ago   Vaping Use   • Vaping Use: Never used   Substance and Sexual Activity   • Alcohol use: Yes     Comment: rare       Family History   Problem Relation Age of Onset   • Colon polyps Father    • Colon cancer Paternal Grandmother        Review of Systems   Constitutional: Negative for appetite change, chills, diaphoresis, fatigue, fever and unexpected weight change.   HENT: Negative for nosebleeds, postnasal drip, sore throat, trouble swallowing and voice change.    Respiratory: Negative for cough, choking, chest tightness, shortness of breath, wheezing and stridor.    Cardiovascular: Negative for chest pain, palpitations and leg swelling.   Gastrointestinal: Negative for abdominal distention, abdominal pain, anal bleeding, blood in stool, constipation, diarrhea, nausea, rectal pain and vomiting.   Endocrine: Negative for polydipsia, polyphagia and polyuria.   Musculoskeletal: Negative for gait problem.   Skin: Negative for  rash and wound.   Allergic/Immunologic: Negative for food allergies.   Neurological: Negative for dizziness, speech difficulty and light-headedness.   Psychiatric/Behavioral: Negative for confusion, self-injury, sleep disturbance and suicidal ideas.       Vitals:    03/03/23 0859   BP: 175/99   Pulse: 57   Temp: 97.3 °F (36.3 °C)   SpO2: 98%       Physical Exam  Constitutional:       General: She is not in acute distress.     Appearance: She is well-developed. She is not ill-appearing.   HENT:      Head: Normocephalic.   Eyes:      Pupils: Pupils are equal, round, and reactive to light.   Cardiovascular:      Rate and Rhythm: Normal rate and regular rhythm.      Heart sounds: Normal heart sounds.   Pulmonary:      Effort: Pulmonary effort is normal.      Breath sounds: Normal breath sounds.   Abdominal:      General: Bowel sounds are normal. There is no distension.      Palpations: Abdomen is soft. There is no mass.      Tenderness: There is no abdominal tenderness. There is no guarding or rebound.      Hernia: No hernia is present.   Musculoskeletal:         General: Normal range of motion.   Skin:     General: Skin is warm and dry.   Neurological:      Mental Status: She is alert and oriented to person, place, and time.   Psychiatric:         Speech: Speech normal.         Behavior: Behavior normal.         Judgment: Judgment normal.         No radiology results for the last 7 days     Diagnoses and all orders for this visit:    1. Gastroesophageal reflux disease without esophagitis (Primary)  Overview:  Added automatically from request for surgery [id number]      2. Diarrhea, unspecified type    3. Hyperplastic colonic polyp, unspecified part of colon  Overview:  Added automatically from request for surgery [id number]      4. Family history of colon cancer  Overview:  Added automatically from request for surgery [id number]       Reviewed EGD and colonoscopy results with her today.  GERD seems well controlled on Protonix  40 mg twice daily.  Continue this for the next 3 months.  Then consider tapering.  Continue GERD precautions.  Bowels moving well on high-fiber diet.  No longer having any diarrhea.  We did discuss possible microscopic colitis on her path report at this time we will not treat it since she is not symptomatic anymore.  Overall she is doing well.  Patient to call the office with any issues.  Patient to follow-up in 3 months.  Patient is agreeable to the plan.

## 2023-06-07 ENCOUNTER — OFFICE VISIT (OUTPATIENT)
Dept: GASTROENTEROLOGY | Facility: CLINIC | Age: 48
End: 2023-06-07
Payer: COMMERCIAL

## 2023-06-07 VITALS
HEIGHT: 66 IN | SYSTOLIC BLOOD PRESSURE: 179 MMHG | BODY MASS INDEX: 28.33 KG/M2 | DIASTOLIC BLOOD PRESSURE: 106 MMHG | OXYGEN SATURATION: 97 % | TEMPERATURE: 97.3 F | WEIGHT: 176.3 LBS | HEART RATE: 49 BPM

## 2023-06-07 DIAGNOSIS — Z83.71 FH: COLON POLYPS: ICD-10-CM

## 2023-06-07 DIAGNOSIS — Z80.0 FAMILY HISTORY OF COLON CANCER: ICD-10-CM

## 2023-06-07 DIAGNOSIS — K21.9 GASTROESOPHAGEAL REFLUX DISEASE WITHOUT ESOPHAGITIS: Primary | ICD-10-CM

## 2023-06-07 PROCEDURE — 99213 OFFICE O/P EST LOW 20 MIN: CPT | Performed by: INTERNAL MEDICINE

## 2023-06-07 RX ORDER — HYDROCHLOROTHIAZIDE 25 MG/1
TABLET ORAL
COMMUNITY
Start: 2023-03-31

## 2023-06-07 NOTE — PROGRESS NOTES
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 EGD showed mild chronic gastritis. Path was negative. Colonoscopy showed nonspecific inflammation of the colon biopsies. Rectal biopsies were normal. Colonoscopy otherwise normal. Path was positive for questionable microscopic colitis.  I recommended a repeat colonoscopy in 5 years.       She ffeels much better on Protonix 40 mg/day. It helps the nausea and heartburn. No chest or abdominal pain. No dyspahgia. NO diarrhea or constiaption. NO rectal bleeding or melena. Weight stable.     Past Medical History:   Diagnosis Date    Breast disorder     Titanium marker to left breast    Diverticulitis of colon     Endometriosis     GERD (gastroesophageal reflux disease)     History of colonoscopy with polypectomy     Hypertension     Irritable bowel syndrome with both constipation and diarrhea 10/25/2022       Past Surgical History:   Procedure Laterality Date    APPENDECTOMY      BILATERAL BREAST REDUCTION      COLONOSCOPY N/A 09/17/2018    Procedure: COLONOSCOPY to CECUM/TI with COLD BX POLYPECTOMY;  Surgeon: Frandy Snow MD;  Location: [facility] ENDOSCOPY;  Service: Gastroenterology    COLONOSCOPY N/A 01/24/2023    Procedure: COLONOSCOPY TO CECUM AND TERMINAL ILEUM WITH BIOPSIES;  Surgeon: Frandy Snow MD;  Location: [facility] ENDOSCOPY;  Service: Gastroenterology;  Laterality: N/A;  PRE- FAMILY HISTORY OF COLON CANCER, DIARRHEA  POST- INTERNAL HEMORRHOIDS, DIVERTICULOSIS    ENDOSCOPY N/A 01/24/2023    Procedure: ESOPHAGOGASTRODUODENOSCOPY WITH BIOPSIES;  Surgeon: Frandy Snow MD;  Location: [facility] ENDOSCOPY;  Service: Gastroenterology;  Laterality: N/A;  PRE- DYSPEPSIA  POST- GASTRITS    HYSTERECTOMY      UPPER GASTROINTESTINAL ENDOSCOPY           Current Outpatient Medications:     atorvastatin (LIPITOR) 10 MG tablet, Take 1 tablet by mouth  Daily., Disp: , Rfl:     cholecalciferol (VITAMIN D3) 25 MCG (1000 UT) tablet, Take 1 tablet by mouth Daily., Disp: , Rfl:     hydroCHLOROthiazide (HYDRODIURIL) 25 MG tablet, , Disp: , Rfl:     hydroxychloroquine (PLAQUENIL) 200 MG tablet, Take 1 tablet by mouth Every 12 (Twelve) Hours., Disp: , Rfl:     lisinopril (PRINIVIL,ZESTRIL) 40 MG tablet, , Disp: , Rfl:     metoprolol tartrate (LOPRESSOR) 50 MG tablet, 2 tablets 2 (Two) Times a Day., Disp: , Rfl:     naproxen (NAPROSYN) 500 MG tablet, , Disp: , Rfl:     ondansetron (Zofran) 4 MG tablet, Take 1 tablet by mouth Every 8 (Eight) Hours As Needed for Nausea or Vomiting., Disp: 15 tablet, Rfl: 1    pantoprazole (PROTONIX) 40 MG EC tablet, Take 1 tablet by mouth 2 (Two) Times a Day Before Meals., Disp: 180 tablet, Rfl: 3    sertraline (ZOLOFT) 25 MG tablet, , Disp: , Rfl:     Allergies   Allergen Reactions    Chlorhexidine Rash       Family History   Problem Relation Age of Onset    Colon polyps Father     Colon cancer Paternal Grandmother        Social History     Socioeconomic History    Marital status:    Tobacco Use    Smoking status: Former    Tobacco comments:     quit smoking 10years ago   Vaping Use    Vaping Use: Never used   Substance and Sexual Activity    Alcohol use: Yes     Alcohol/week: 2.0 standard drinks     Types: 2 Glasses of wine per week     Comment: rare    Drug use: Never       Review of Systems   Gastrointestinal:  Negative for abdominal pain.   All other systems reviewed and are negative.  Pertinent positives and negatives documented in the HPI and all other systems reviewed and were found to be negative.  Vitals:    06/07/23 0954   BP: (!) 179/106   Pulse: (!) 49   Temp: 97.3 °F (36.3 °C)   SpO2: 97%       Physical Exam  Vitals reviewed.   Constitutional:       General: She is not in acute distress.     Appearance: Normal appearance. She is well-developed. She is not diaphoretic.   HENT:      Head: Normocephalic and atraumatic. Hair  is normal.      Right Ear: Hearing, tympanic membrane, ear canal and external ear normal. No decreased hearing noted. No drainage.      Left Ear: Hearing, tympanic membrane, ear canal and external ear normal. No decreased hearing noted.      Nose: Nose normal. No nasal deformity.      Mouth/Throat:      Mouth: Mucous membranes are moist.   Eyes:      General: Lids are normal.         Right eye: No discharge.         Left eye: No discharge.      Extraocular Movements: Extraocular movements intact.      Conjunctiva/sclera: Conjunctivae normal.      Pupils: Pupils are equal, round, and reactive to light.   Neck:      Thyroid: No thyromegaly.      Vascular: No JVD.      Trachea: No tracheal deviation.   Cardiovascular:      Rate and Rhythm: Normal rate and regular rhythm.      Pulses: Normal pulses.      Heart sounds: Normal heart sounds. No murmur heard.    No friction rub. No gallop.   Pulmonary:      Effort: Pulmonary effort is normal. No respiratory distress.      Breath sounds: Normal breath sounds. No wheezing or rales.   Chest:      Chest wall: No tenderness.   Abdominal:      General: Bowel sounds are normal. There is no distension.      Palpations: Abdomen is soft. There is no mass.      Tenderness: There is no abdominal tenderness. There is no guarding or rebound.      Hernia: No hernia is present.   Musculoskeletal:         General: No tenderness or deformity. Normal range of motion.      Cervical back: Normal range of motion and neck supple.   Lymphadenopathy:      Cervical: No cervical adenopathy.   Skin:     General: Skin is warm and dry.      Findings: No erythema or rash.   Neurological:      Mental Status: She is alert and oriented to person, place, and time.      Cranial Nerves: No cranial nerve deficit.      Motor: No abnormal muscle tone.      Coordination: Coordination normal.      Deep Tendon Reflexes: Reflexes are normal and symmetric. Reflexes normal.   Psychiatric:         Mood and Affect: Mood  normal.         Behavior: Behavior normal.         Thought Content: Thought content normal.         Judgment: Judgment normal.       Diagnoses and all orders for this visit:    1. Gastroesophageal reflux disease without esophagitis (Primary)    2. Family history of colon cancer    3. FH: colon polyps      Assessment:  1. family history of colon cancer (pgm) and colon polyps (dad).   2.  GERD  3. RA (sees Dr. Billingsley)  4.     Recommendations:  1. Decrease the protonix 40 mg to once daily.  2. F/u 6 mos.   3.     Return in about 6 months (around 12/7/2023).    Frandy Snow MD  6/7/2023

## 2023-08-23 RX ORDER — ONDANSETRON 4 MG/1
TABLET, FILM COATED ORAL
Qty: 15 TABLET | Refills: 0 | Status: SHIPPED | OUTPATIENT
Start: 2023-08-23

## 2023-12-07 ENCOUNTER — OFFICE VISIT (OUTPATIENT)
Dept: GASTROENTEROLOGY | Facility: CLINIC | Age: 48
End: 2023-12-07
Payer: COMMERCIAL

## 2023-12-07 VITALS
HEART RATE: 57 BPM | WEIGHT: 178 LBS | SYSTOLIC BLOOD PRESSURE: 125 MMHG | DIASTOLIC BLOOD PRESSURE: 85 MMHG | TEMPERATURE: 98 F | HEIGHT: 66 IN | BODY MASS INDEX: 28.61 KG/M2 | OXYGEN SATURATION: 97 %

## 2023-12-07 DIAGNOSIS — Z80.0 FAMILY HISTORY OF COLON CANCER: Primary | ICD-10-CM

## 2023-12-07 DIAGNOSIS — K21.00 GASTROESOPHAGEAL REFLUX DISEASE WITH ESOPHAGITIS WITHOUT HEMORRHAGE: ICD-10-CM

## 2023-12-07 DIAGNOSIS — D12.6 ADENOMATOUS POLYP OF COLON, UNSPECIFIED PART OF COLON: ICD-10-CM

## 2023-12-07 NOTE — PROGRESS NOTES
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 EGD showed mild chronic gastritis. Path was negative. Colonoscopy showed nonspecific inflammation of the colon biopsies. Rectal biopsies were normal. Colonoscopy otherwise normal. Path was positive for questionable microscopic colitis.  I recommended a repeat colonoscopy in 5 years.        I last saw her in 6 of 2023:  Assessment:  1. family history of colon cancer (pgm) and colon polyps (dad).   2.  GERD  3. RA (sees Dr. Billingsley)  4.      Recommendations:  1. Decrease the protonix 40 mg to once daily.  2. F/u 6 mos.        Protonix 40 mg/AM works well. No heartburn but has nausea occasionallly x many years. NO abdominal or chest pain. No dysphagia. NO diarrhea or constipation. NO rectal bleeding or melena. Her weight is up.     Past Medical History:   Diagnosis Date    Breast disorder     Titanium marker to left breast    Diverticulitis of colon     Endometriosis     GERD (gastroesophageal reflux disease)     History of colonoscopy with polypectomy     Hypertension     Irritable bowel syndrome with both constipation and diarrhea 10/25/2022       Past Surgical History:   Procedure Laterality Date    APPENDECTOMY      BILATERAL BREAST REDUCTION      COLONOSCOPY N/A 09/17/2018    Procedure: COLONOSCOPY to CECUM/TI with COLD BX POLYPECTOMY;  Surgeon: Frandy Snow MD;  Location: [facility] ENDOSCOPY;  Service: Gastroenterology    COLONOSCOPY N/A 01/24/2023    Procedure: COLONOSCOPY TO CECUM AND TERMINAL ILEUM WITH BIOPSIES;  Surgeon: Frandy Snow MD;  Location: [facility] ENDOSCOPY;  Service: Gastroenterology;  Laterality: N/A;  PRE- FAMILY HISTORY OF COLON CANCER, DIARRHEA  POST- INTERNAL HEMORRHOIDS, DIVERTICULOSIS    ENDOSCOPY N/A 01/24/2023    Procedure: ESOPHAGOGASTRODUODENOSCOPY WITH BIOPSIES;  Surgeon:  Frandy Snow MD;  Location: [facility] ENDOSCOPY;  Service: Gastroenterology;  Laterality: N/A;  PRE- DYSPEPSIA  POST- GASTRITS    HYSTERECTOMY      UPPER GASTROINTESTINAL ENDOSCOPY           Current Outpatient Medications:     atorvastatin (LIPITOR) 10 MG tablet, Take 1 tablet by mouth Daily., Disp: , Rfl:     cholecalciferol (VITAMIN D3) 25 MCG (1000 UT) tablet, Take 1 tablet by mouth Daily., Disp: , Rfl:     hydroCHLOROthiazide (HYDRODIURIL) 25 MG tablet, , Disp: , Rfl:     hydroxychloroquine (PLAQUENIL) 200 MG tablet, Take 0.5 tablets by mouth Every 12 (Twelve) Hours., Disp: , Rfl:     lisinopril (PRINIVIL,ZESTRIL) 40 MG tablet, , Disp: , Rfl:     metoprolol tartrate (LOPRESSOR) 50 MG tablet, 2 tablets 2 (Two) Times a Day., Disp: , Rfl:     naproxen (NAPROSYN) 500 MG tablet, As Needed., Disp: , Rfl:     ondansetron (ZOFRAN) 4 MG tablet, TAKE 1 TABLET BY MOUTH EVERY EIGHT HOURS AS NEEDED FOR NAUSEA OR VOMITING, Disp: 15 tablet, Rfl: 0    pantoprazole (PROTONIX) 40 MG EC tablet, Take 1 tablet by mouth 2 (Two) Times a Day Before Meals., Disp: 180 tablet, Rfl: 3    sertraline (ZOLOFT) 25 MG tablet, , Disp: , Rfl:     Allergies   Allergen Reactions    Chlorhexidine Rash       Family History   Problem Relation Age of Onset    Colon polyps Father     Colon cancer Paternal Grandmother        Social History     Socioeconomic History    Marital status:    Tobacco Use    Smoking status: Former    Tobacco comments:     quit smoking 10years ago   Vaping Use    Vaping Use: Never used   Substance and Sexual Activity    Alcohol use: Yes     Alcohol/week: 2.0 standard drinks of alcohol     Types: 2 Glasses of wine per week     Comment: rare    Drug use: Never       Review of Systems   Gastrointestinal:  Positive for nausea. Negative for abdominal pain.   All other systems reviewed and are negative.    Pertinent positives and negatives documented in the HPI and all other systems reviewed and were found to be  negative.  Vitals:    12/07/23 1115   BP: 125/85   Pulse: 57   Temp: 98 °F (36.7 °C)   SpO2: 97%       Physical Exam  Vitals reviewed.   Constitutional:       General: She is not in acute distress.     Appearance: Normal appearance. She is well-developed. She is obese. She is not diaphoretic.   HENT:      Head: Normocephalic and atraumatic. Hair is normal.      Right Ear: Hearing, tympanic membrane, ear canal and external ear normal. No decreased hearing noted. No drainage.      Left Ear: Hearing, tympanic membrane, ear canal and external ear normal. No decreased hearing noted.      Nose: Nose normal. No nasal deformity.      Mouth/Throat:      Mouth: Mucous membranes are moist.   Eyes:      General: Lids are normal.         Right eye: No discharge.         Left eye: No discharge.      Extraocular Movements: Extraocular movements intact.      Conjunctiva/sclera: Conjunctivae normal.      Pupils: Pupils are equal, round, and reactive to light.   Neck:      Thyroid: No thyromegaly.      Vascular: No JVD.      Trachea: No tracheal deviation.   Cardiovascular:      Rate and Rhythm: Normal rate and regular rhythm.      Pulses: Normal pulses.      Heart sounds: Normal heart sounds. No murmur heard.     No friction rub. No gallop.   Pulmonary:      Effort: Pulmonary effort is normal. No respiratory distress.      Breath sounds: Normal breath sounds. No wheezing or rales.   Chest:      Chest wall: No tenderness.   Abdominal:      General: Bowel sounds are normal. There is no distension.      Palpations: Abdomen is soft. There is no mass.      Tenderness: There is no abdominal tenderness. There is no guarding or rebound.      Hernia: No hernia is present.   Musculoskeletal:         General: No tenderness or deformity. Normal range of motion.      Cervical back: Normal range of motion and neck supple.   Lymphadenopathy:      Cervical: No cervical adenopathy.   Skin:     General: Skin is warm and dry.      Findings: No  erythema or rash.   Neurological:      Mental Status: She is alert and oriented to person, place, and time.      Cranial Nerves: No cranial nerve deficit.      Motor: No abnormal muscle tone.      Coordination: Coordination normal.      Deep Tendon Reflexes: Reflexes are normal and symmetric. Reflexes normal.   Psychiatric:         Mood and Affect: Mood normal.         Behavior: Behavior normal.         Thought Content: Thought content normal.         Judgment: Judgment normal.         Diagnoses and all orders for this visit:    1. Family history of colon cancer (Primary)    2. Adenomatous polyp of colon, unspecified part of colon    3. Gastroesophageal reflux disease with esophagitis without hemorrhage      Assessment:  family history of colon cancer (pgm) and colon polyps (dad).    GERD   RA (sees Dr. Billingsley)  Nausea intermittently      Recommendations:  Labs: she will have this done by her PCP.   We discussed doing another CT abd/pelvis but she prefers not.  F/u 1 yr.    Return in about 1 year (around 12/7/2024).    Frandy Snow MD  12/7/2023

## 2024-09-05 ENCOUNTER — PATIENT ROUNDING (BHMG ONLY) (OUTPATIENT)
Dept: CARDIOLOGY | Facility: CLINIC | Age: 49
End: 2024-09-05
Payer: COMMERCIAL

## 2024-09-05 ENCOUNTER — OFFICE VISIT (OUTPATIENT)
Dept: CARDIOLOGY | Facility: CLINIC | Age: 49
End: 2024-09-05
Payer: COMMERCIAL

## 2024-09-05 VITALS
DIASTOLIC BLOOD PRESSURE: 93 MMHG | HEIGHT: 66 IN | SYSTOLIC BLOOD PRESSURE: 140 MMHG | HEART RATE: 56 BPM | WEIGHT: 145 LBS | BODY MASS INDEX: 23.3 KG/M2

## 2024-09-05 DIAGNOSIS — R42 DIZZINESS: ICD-10-CM

## 2024-09-05 DIAGNOSIS — I10 PRIMARY HYPERTENSION: ICD-10-CM

## 2024-09-05 DIAGNOSIS — R94.31 ABNORMAL EKG: Primary | ICD-10-CM

## 2024-09-05 DIAGNOSIS — R55 SYNCOPE AND COLLAPSE: ICD-10-CM

## 2024-09-05 PROCEDURE — 93000 ELECTROCARDIOGRAM COMPLETE: CPT | Performed by: INTERNAL MEDICINE

## 2024-09-05 PROCEDURE — 99204 OFFICE O/P NEW MOD 45 MIN: CPT | Performed by: INTERNAL MEDICINE

## 2024-09-05 RX ORDER — BISOPROLOL FUMARATE AND HYDROCHLOROTHIAZIDE 5; 6.25 MG/1; MG/1
1 TABLET ORAL DAILY
Qty: 30 TABLET | Refills: 6 | Status: SHIPPED | OUTPATIENT
Start: 2024-09-05

## 2024-09-05 RX ORDER — OLMESARTAN MEDOXOMIL 20 MG/1
20 TABLET ORAL DAILY
Qty: 30 TABLET | Refills: 3 | Status: SHIPPED | OUTPATIENT
Start: 2024-09-05

## 2024-09-05 NOTE — PROGRESS NOTES
September 5, 2024    Tell me about your visit with us. What things went well?         We're always looking for ways to make our patients' experiences even better. Do you have recommendations on ways we may improve?      Overall were you satisfied with your first visit to our practice?        I appreciate you taking the time to speak with me today. Is there anything else I can do for you?       Thank you, and have a great day.

## 2024-09-25 ENCOUNTER — HOSPITAL ENCOUNTER (OUTPATIENT)
Dept: CARDIOLOGY | Facility: HOSPITAL | Age: 49
Discharge: HOME | End: 2024-09-25
Payer: COMMERCIAL

## 2024-09-25 VITALS
BODY MASS INDEX: 23.3 KG/M2 | SYSTOLIC BLOOD PRESSURE: 118 MMHG | HEART RATE: 59 BPM | WEIGHT: 145 LBS | DIASTOLIC BLOOD PRESSURE: 70 MMHG | OXYGEN SATURATION: 99 % | HEIGHT: 66 IN

## 2024-09-25 VITALS
OXYGEN SATURATION: 99 % | BODY MASS INDEX: 23.31 KG/M2 | HEIGHT: 66 IN | WEIGHT: 145.06 LBS | HEART RATE: 59 BPM | SYSTOLIC BLOOD PRESSURE: 118 MMHG

## 2024-09-25 PROBLEM — R55 SYNCOPE AND COLLAPSE: Status: ACTIVE | Noted: 2024-09-25

## 2024-09-25 PROBLEM — R42 DIZZINESS: Status: ACTIVE | Noted: 2024-09-25

## 2024-09-25 PROBLEM — R94.31 ABNORMAL EKG: Status: ACTIVE | Noted: 2024-09-25

## 2024-09-25 PROBLEM — I10 PRIMARY HYPERTENSION: Status: ACTIVE | Noted: 2024-09-25

## 2024-09-25 PROCEDURE — 93306 TTE W/DOPPLER COMPLETE: CPT

## 2024-09-25 PROCEDURE — 93017 CV STRESS TEST TRACING ONLY: CPT

## 2024-09-25 PROCEDURE — 93306 TTE W/DOPPLER COMPLETE: CPT | Performed by: INTERNAL MEDICINE

## 2024-09-26 ENCOUNTER — TELEPHONE (OUTPATIENT)
Dept: CARDIOLOGY | Facility: HOSPITAL | Age: 49
End: 2024-09-26
Payer: COMMERCIAL

## 2024-09-26 LAB
AORTIC ARCH: 2.7 CM
AORTIC DIMENSIONLESS INDEX: 0.8 (DI)
ASCENDING AORTA: 2.7 CM
BH CV ECHO MEAS - ACS: 1.65 CM
BH CV ECHO MEAS - AO MAX PG: 4.9 MMHG
BH CV ECHO MEAS - AO MEAN PG: 2.7 MMHG
BH CV ECHO MEAS - AO V2 MAX: 110.3 CM/SEC
BH CV ECHO MEAS - AO V2 VTI: 25.5 CM
BH CV ECHO MEAS - AVA(I,D): 2.36 CM2
BH CV ECHO MEAS - EDV(CUBED): 102.3 ML
BH CV ECHO MEAS - EDV(MOD-SP2): 75 ML
BH CV ECHO MEAS - EDV(MOD-SP4): 74 ML
BH CV ECHO MEAS - EF(MOD-BP): 78.9 %
BH CV ECHO MEAS - EF(MOD-SP2): 76 %
BH CV ECHO MEAS - EF(MOD-SP4): 81.1 %
BH CV ECHO MEAS - ESV(CUBED): 22.2 ML
BH CV ECHO MEAS - ESV(MOD-SP2): 18 ML
BH CV ECHO MEAS - ESV(MOD-SP4): 14 ML
BH CV ECHO MEAS - FS: 39.9 %
BH CV ECHO MEAS - IVS/LVPW: 1.23 CM
BH CV ECHO MEAS - IVSD: 0.91 CM
BH CV ECHO MEAS - LA A2CS (ATRIAL LENGTH): 4.6 CM
BH CV ECHO MEAS - LA A4C LENGTH: 4.1 CM
BH CV ECHO MEAS - LAT PEAK E' VEL: 9.8 CM/SEC
BH CV ECHO MEAS - LV MASS(C)D: 126.4 GRAMS
BH CV ECHO MEAS - LV MAX PG: 3.9 MMHG
BH CV ECHO MEAS - LV MEAN PG: 1.91 MMHG
BH CV ECHO MEAS - LV V1 MAX: 98.5 CM/SEC
BH CV ECHO MEAS - LV V1 VTI: 20.8 CM
BH CV ECHO MEAS - LVIDD: 4.7 CM
BH CV ECHO MEAS - LVIDS: 2.8 CM
BH CV ECHO MEAS - LVOT AREA: 2.9 CM2
BH CV ECHO MEAS - LVOT DIAM: 1.92 CM
BH CV ECHO MEAS - LVPWD: 0.74 CM
BH CV ECHO MEAS - MED PEAK E' VEL: 8.5 CM/SEC
BH CV ECHO MEAS - MR MAX PG: 65.5 MMHG
BH CV ECHO MEAS - MR MAX VEL: 404.7 CM/SEC
BH CV ECHO MEAS - MV A DUR: 0.16 SEC
BH CV ECHO MEAS - MV A MAX VEL: 63.1 CM/SEC
BH CV ECHO MEAS - MV DEC SLOPE: 303.7 CM/SEC2
BH CV ECHO MEAS - MV DEC TIME: 0.16 SEC
BH CV ECHO MEAS - MV E MAX VEL: 69.8 CM/SEC
BH CV ECHO MEAS - MV E/A: 1.11
BH CV ECHO MEAS - MV MAX PG: 2.8 MMHG
BH CV ECHO MEAS - MV MEAN PG: 1.22 MMHG
BH CV ECHO MEAS - MV P1/2T: 77.3 MSEC
BH CV ECHO MEAS - MV V2 VTI: 26.1 CM
BH CV ECHO MEAS - MVA(P1/2T): 2.8 CM2
BH CV ECHO MEAS - MVA(VTI): 2.31 CM2
BH CV ECHO MEAS - PA ACC TIME: 0.18 SEC
BH CV ECHO MEAS - PA V2 MAX: 81.4 CM/SEC
BH CV ECHO MEAS - PULM A REVS DUR: 0.09 SEC
BH CV ECHO MEAS - PULM A REVS VEL: 27.2 CM/SEC
BH CV ECHO MEAS - PULM DIAS VEL: 52.4 CM/SEC
BH CV ECHO MEAS - PULM S/D: 1.12
BH CV ECHO MEAS - PULM SYS VEL: 58.7 CM/SEC
BH CV ECHO MEAS - RAP SYSTOLE: 3 MMHG
BH CV ECHO MEAS - RV MAX PG: 1.62 MMHG
BH CV ECHO MEAS - RV V1 MAX: 63.5 CM/SEC
BH CV ECHO MEAS - RV V1 VTI: 14 CM
BH CV ECHO MEAS - RVSP: 18.9 MMHG
BH CV ECHO MEAS - SV(LVOT): 60.2 ML
BH CV ECHO MEAS - SV(MOD-SP2): 57 ML
BH CV ECHO MEAS - SV(MOD-SP4): 60 ML
BH CV ECHO MEAS - TAPSE (>1.6): 2.3 CM
BH CV ECHO MEAS - TR MAX PG: 15.9 MMHG
BH CV ECHO MEAS - TR MAX VEL: 199.1 CM/SEC
BH CV ECHO MEASUREMENTS AVERAGE E/E' RATIO: 7.63
BH CV XLRA - RV BASE: 3.2 CM
BH CV XLRA - RV LENGTH: 6.5 CM
BH CV XLRA - RV MID: 2.6 CM
BH CV XLRA - TDI S': 13.5 CM/SEC
LEFT ATRIUM VOLUME INDEX: 23.7 ML/M2
SINUS: 3.3 CM
STJ: 2.6 CM

## 2024-09-27 LAB
BH CV STRESS BP STAGE 1: NORMAL
BH CV STRESS BP STAGE 2: NORMAL
BH CV STRESS BP STAGE 3: NORMAL
BH CV STRESS BP STAGE 4: NORMAL
BH CV STRESS DURATION MIN STAGE 1: 3
BH CV STRESS DURATION MIN STAGE 2: 3
BH CV STRESS DURATION MIN STAGE 3: 3
BH CV STRESS DURATION MIN STAGE 4: 1
BH CV STRESS DURATION SEC STAGE 1: 0
BH CV STRESS DURATION SEC STAGE 2: 0
BH CV STRESS DURATION SEC STAGE 3: 0
BH CV STRESS DURATION SEC STAGE 4: 51
BH CV STRESS GRADE STAGE 1: 10
BH CV STRESS GRADE STAGE 2: 12
BH CV STRESS GRADE STAGE 3: 14
BH CV STRESS GRADE STAGE 4: 16
BH CV STRESS HR STAGE 1: 82
BH CV STRESS HR STAGE 2: 92
BH CV STRESS HR STAGE 3: 104
BH CV STRESS HR STAGE 4: 132
BH CV STRESS METS STAGE 1: 4.6
BH CV STRESS METS STAGE 2: 7.1
BH CV STRESS METS STAGE 3: 10.2
BH CV STRESS METS STAGE 4: 13.2
BH CV STRESS PROTOCOL 1: NORMAL
BH CV STRESS RECOVERY BP: NORMAL MMHG
BH CV STRESS RECOVERY HR: 76 BPM
BH CV STRESS SPEED STAGE 1: 1.7
BH CV STRESS SPEED STAGE 2: 2.5
BH CV STRESS SPEED STAGE 3: 3.4
BH CV STRESS SPEED STAGE 4: 4.2
BH CV STRESS STAGE 1: 1
BH CV STRESS STAGE 2: 2
BH CV STRESS STAGE 3: 3
BH CV STRESS STAGE 4: 4
MAXIMAL PREDICTED HEART RATE: 169 BPM
PERCENT MAX PREDICTED HR: 78.11 %
STRESS BASELINE BP: NORMAL MMHG
STRESS BASELINE HR: 59 BPM
STRESS O2 SAT REST: 99 %
STRESS PERCENT HR: 92 %
STRESS POST ESTIMATED WORKLOAD: 13.2 METS
STRESS POST EXERCISE DUR MIN: 10 MIN
STRESS POST EXERCISE DUR SEC: 51 SEC
STRESS POST PEAK BP: NORMAL MMHG
STRESS POST PEAK HR: 132 BPM
STRESS TARGET HR: 144 BPM

## 2024-10-01 RX ORDER — BISOPROLOL FUMARATE AND HYDROCHLOROTHIAZIDE 5; 6.25 MG/1; MG/1
1 TABLET ORAL DAILY
Qty: 90 TABLET | Refills: 1 | Status: SHIPPED | OUTPATIENT
Start: 2024-10-01

## 2024-10-02 ENCOUNTER — OFFICE VISIT (OUTPATIENT)
Dept: CARDIOLOGY | Facility: CLINIC | Age: 49
End: 2024-10-02
Payer: COMMERCIAL

## 2024-10-02 VITALS
WEIGHT: 141 LBS | DIASTOLIC BLOOD PRESSURE: 84 MMHG | HEART RATE: 64 BPM | HEIGHT: 65 IN | BODY MASS INDEX: 23.49 KG/M2 | SYSTOLIC BLOOD PRESSURE: 139 MMHG

## 2024-10-02 DIAGNOSIS — R94.31 ABNORMAL EKG: Primary | ICD-10-CM

## 2024-10-02 DIAGNOSIS — I10 PRIMARY HYPERTENSION: ICD-10-CM

## 2024-10-02 DIAGNOSIS — R55 SYNCOPE AND COLLAPSE: ICD-10-CM

## 2024-10-02 DIAGNOSIS — R42 DIZZINESS: ICD-10-CM

## 2024-10-02 PROCEDURE — 99213 OFFICE O/P EST LOW 20 MIN: CPT | Performed by: INTERNAL MEDICINE

## 2024-10-02 NOTE — PROGRESS NOTES
TEST RESULT   Subjective:        Morena France is a 49 y.o. female who here for follow up    CC  Follow up benicar, ziac  HPI  49-year-old with abnormal EKG hypertension and dizziness here for the follow-up feeling much better with the     Problems Addressed this Visit          Cardiac and Vasculature    Abnormal EKG - Primary    Primary hypertension       Symptoms and Signs    Syncope and collapse    Dizziness     Diagnoses         Codes Comments    Abnormal EKG    -  Primary ICD-10-CM: R94.31  ICD-9-CM: 794.31     Dizziness     ICD-10-CM: R42  ICD-9-CM: 780.4     Primary hypertension     ICD-10-CM: I10  ICD-9-CM: 401.9     Syncope and collapse     ICD-10-CM: R55  ICD-9-CM: 780.2           .    The following portions of the patient's history were reviewed and updated as appropriate: allergies, current medications, past family history, past medical history, past social history, past surgical history and problem list.    Past Medical History:   Diagnosis Date    Breast disorder     Titanium marker to left breast    Diverticulitis of colon     Endometriosis     GERD (gastroesophageal reflux disease)     History of colonoscopy with polypectomy     Hypertension     Irritable bowel syndrome with both constipation and diarrhea 10/25/2022    Syncope      reports that she has quit smoking. She does not have any smokeless tobacco history on file. She reports current alcohol use of about 2.0 standard drinks of alcohol per week. She reports that she does not use drugs.   Family History   Problem Relation Age of Onset    Arrhythmia Mother     Hypertension Mother     Heart disease Father     Hypertension Father     Colon polyps Father     Hypertension Brother     Colon cancer Paternal Grandmother        Review of Systems  Constitutional: No wt loss, fever, fatigue  Gastrointestinal: No nausea, abdominal pain  Behavioral/Psych: No insomnia or anxiety   Cardiovascular no chest pains or tightness in the chest  Objective:     "   Physical Exam  /84   Pulse 64   Ht 165.1 cm (65\")   Wt 64 kg (141 lb)   LMP 09/10/2018   BMI 23.46 kg/m²   General appearance: No acute changes   Neck: Trachea midline; NECK, supple, no thyromegaly or lymphadenopathy   Lungs: Normal size and shape, normal breath sounds, equal distribution of air, no rales and rhonchi   CV: S1-S2 regular, no murmurs, no rub, no gallop   Abdomen: Soft, nontender; no masses , no abnormal abdominal sounds   Extremities: No deformity , normal color , no peripheral edema   Skin: Normal temperature, turgor and texture; no rash, ulcers          Procedures      Echocardiogram:    Results for orders placed in visit on 09/05/24    Adult Transthoracic Echo Complete W/ Cont if Necessary Per Protocol    Interpretation Summary    Left ventricular ejection fraction appears to be 66 - 70%.    Left ventricular diastolic function was normal.    Estimated right ventricular systolic pressure from tricuspid regurgitation is normal (<35 mmHg).  No ECG evidence of myocardial ischemia. Negative clinical evidence of myocardial ischemia. Findings consistent with a normal ECG stress test.         Current Outpatient Medications:     atorvastatin (LIPITOR) 10 MG tablet, Take 1 tablet by mouth Daily., Disp: , Rfl:     bisoprolol-hydrochlorothiazide (Ziac) 5-6.25 MG per tablet, Take 1 tablet by mouth Daily., Disp: 90 tablet, Rfl: 1    cholecalciferol (VITAMIN D3) 25 MCG (1000 UT) tablet, Take 1 tablet by mouth Daily., Disp: , Rfl:     naproxen (NAPROSYN) 500 MG tablet, As Needed., Disp: , Rfl:     ondansetron (ZOFRAN) 4 MG tablet, TAKE 1 TABLET BY MOUTH EVERY EIGHT HOURS AS NEEDED FOR NAUSEA OR VOMITING, Disp: 15 tablet, Rfl: 0    pantoprazole (PROTONIX) 40 MG EC tablet, Take 1 tablet by mouth 2 (Two) Times a Day Before Meals., Disp: 180 tablet, Rfl: 3    hydroxychloroquine (PLAQUENIL) 200 MG tablet, Take 0.5 tablets by mouth Every 12 (Twelve) Hours., Disp: , Rfl:     olmesartan (Benicar) 20 MG " tablet, Take 1 tablet by mouth Daily., Disp: 90 tablet, Rfl: 3    sertraline (ZOLOFT) 25 MG tablet, , Disp: , Rfl:    Assessment:                Plan:          ICD-10-CM ICD-9-CM   1. Abnormal EKG  R94.31 794.31   2. Dizziness  R42 780.4   3. Primary hypertension  I10 401.9   4. Syncope and collapse  R55 780.2     1. Abnormal EKG  No angina pectoris    2. Dizziness  Multifactorial    3. Primary hypertension  Continue current treatment    4. Syncope and collapse  No more episodes    1 yr  COUNSELING:    Morena Sellers was given to patient for the following topics: diagnostic results, risk factor reductions, impressions, risks and benefits of treatment options and importance of treatment compliance .       SMOKING COUNSELING:        Dictated using Dragon dictation

## 2024-10-08 RX ORDER — OLMESARTAN MEDOXOMIL 20 MG/1
20 TABLET ORAL DAILY
Qty: 90 TABLET | Refills: 3 | Status: SHIPPED | OUTPATIENT
Start: 2024-10-08

## 2025-04-08 RX ORDER — BISOPROLOL FUMARATE AND HYDROCHLOROTHIAZIDE 5; 6.25 MG/1; MG/1
1 TABLET ORAL DAILY
Qty: 90 TABLET | Refills: 1 | Status: SHIPPED | OUTPATIENT
Start: 2025-04-08

## (undated) DEVICE — Device: Brand: DEFENDO AIR/WATER/SUCTION AND BIOPSY VALVE

## (undated) DEVICE — LN SMPL CO2 SHTRM SD STREAM W/M LUER

## (undated) DEVICE — FRCP BX RADJAW4 NDL 2.8 240CM LG OG BX40

## (undated) DEVICE — KT ORCA ORCAPOD DISP STRL

## (undated) DEVICE — TUBING, SUCTION, 1/4" X 10', STRAIGHT: Brand: MEDLINE

## (undated) DEVICE — BITEBLOCK OMNI BLOC

## (undated) DEVICE — MSK PROC CURAPLEX O2 2/ADAPT 7FT

## (undated) DEVICE — ADAPT CLN BIOGUARD AIR/H2O DISP

## (undated) DEVICE — THE TORRENT IRRIGATION SCOPE CONNECTOR IS USED WITH THE TORRENT IRRIGATION TUBING TO PROVIDE IRRIGATION FLUIDS SUCH AS STERILE WATER DURING GASTROINTESTINAL ENDOSCOPIC PROCEDURES WHEN USED IN CONJUNCTION WITH AN IRRIGATION PUMP (OR ELECTROSURGICAL UNIT).: Brand: TORRENT

## (undated) DEVICE — SINGLE-USE BIOPSY FORCEPS: Brand: RADIAL JAW 4

## (undated) DEVICE — SENSR O2 OXIMAX FNGR A/ 18IN NONSTR

## (undated) DEVICE — CANN NASL CO2 TRULINK W/O2 A/